# Patient Record
Sex: FEMALE | Race: WHITE | NOT HISPANIC OR LATINO | Employment: OTHER | URBAN - METROPOLITAN AREA
[De-identification: names, ages, dates, MRNs, and addresses within clinical notes are randomized per-mention and may not be internally consistent; named-entity substitution may affect disease eponyms.]

---

## 2021-11-11 ENCOUNTER — OFFICE VISIT (OUTPATIENT)
Dept: FAMILY MEDICINE CLINIC | Facility: CLINIC | Age: 81
End: 2021-11-11

## 2021-11-11 VITALS
HEIGHT: 61 IN | WEIGHT: 157.4 LBS | BODY MASS INDEX: 29.72 KG/M2 | OXYGEN SATURATION: 93 % | SYSTOLIC BLOOD PRESSURE: 109 MMHG | DIASTOLIC BLOOD PRESSURE: 64 MMHG | HEART RATE: 81 BPM

## 2021-11-11 DIAGNOSIS — M1A.9XX0 CHRONIC GOUT WITHOUT TOPHUS, UNSPECIFIED CAUSE, UNSPECIFIED SITE: ICD-10-CM

## 2021-11-11 DIAGNOSIS — E11.9 TYPE 2 DIABETES MELLITUS WITHOUT COMPLICATION, WITHOUT LONG-TERM CURRENT USE OF INSULIN (HCC): ICD-10-CM

## 2021-11-11 DIAGNOSIS — M54.50 CHRONIC BILATERAL LOW BACK PAIN WITHOUT SCIATICA: Primary | ICD-10-CM

## 2021-11-11 DIAGNOSIS — Z95.1 HISTORY OF HEART BYPASS SURGERY: ICD-10-CM

## 2021-11-11 DIAGNOSIS — F32.A ANXIETY AND DEPRESSION: ICD-10-CM

## 2021-11-11 DIAGNOSIS — E78.5 HYPERLIPIDEMIA, UNSPECIFIED HYPERLIPIDEMIA TYPE: ICD-10-CM

## 2021-11-11 DIAGNOSIS — I10 PRIMARY HYPERTENSION: ICD-10-CM

## 2021-11-11 DIAGNOSIS — F41.9 ANXIETY AND DEPRESSION: ICD-10-CM

## 2021-11-11 DIAGNOSIS — I25.810 CORONARY ARTERY DISEASE INVOLVING OTHER CORONARY ARTERY BYPASS GRAFT WITHOUT ANGINA PECTORIS: ICD-10-CM

## 2021-11-11 DIAGNOSIS — Z85.3 HISTORY OF BREAST CANCER: ICD-10-CM

## 2021-11-11 DIAGNOSIS — Z78.0 POSTMENOPAUSAL: ICD-10-CM

## 2021-11-11 DIAGNOSIS — G89.29 CHRONIC BILATERAL LOW BACK PAIN WITHOUT SCIATICA: Primary | ICD-10-CM

## 2021-11-11 DIAGNOSIS — Z23 NEED FOR INFLUENZA VACCINATION: ICD-10-CM

## 2021-11-11 PROCEDURE — 90662 IIV NO PRSV INCREASED AG IM: CPT | Performed by: NURSE PRACTITIONER

## 2021-11-11 PROCEDURE — G0008 ADMIN INFLUENZA VIRUS VAC: HCPCS | Performed by: NURSE PRACTITIONER

## 2021-11-11 PROCEDURE — 99204 OFFICE O/P NEW MOD 45 MIN: CPT | Performed by: NURSE PRACTITIONER

## 2021-11-11 RX ORDER — METOPROLOL TARTRATE 50 MG/1
50 TABLET, FILM COATED ORAL DAILY
COMMUNITY
End: 2022-01-21 | Stop reason: SDUPTHER

## 2021-11-11 RX ORDER — ALPRAZOLAM 0.5 MG/1
0.5 TABLET ORAL 2 TIMES DAILY PRN
COMMUNITY
End: 2021-11-11 | Stop reason: ALTCHOICE

## 2021-11-11 RX ORDER — VITAMIN B COMPLEX
TABLET ORAL
COMMUNITY
End: 2022-01-21 | Stop reason: SDUPTHER

## 2021-11-11 RX ORDER — ATORVASTATIN CALCIUM 80 MG/1
80 TABLET, FILM COATED ORAL DAILY
COMMUNITY
End: 2022-01-21 | Stop reason: SDUPTHER

## 2021-11-11 RX ORDER — ISOSORBIDE MONONITRATE 60 MG/1
60 TABLET, EXTENDED RELEASE ORAL DAILY
COMMUNITY
End: 2022-01-21 | Stop reason: SDUPTHER

## 2021-11-11 RX ORDER — GLIPIZIDE 5 MG/1
5 TABLET ORAL DAILY
COMMUNITY
Start: 2021-10-29 | End: 2022-01-21 | Stop reason: SDUPTHER

## 2021-11-11 RX ORDER — POTASSIUM CHLORIDE 1.5 G/1.77G
20 POWDER, FOR SOLUTION ORAL 2 TIMES DAILY
COMMUNITY
End: 2022-01-21 | Stop reason: SDUPTHER

## 2021-11-11 RX ORDER — FUROSEMIDE 80 MG
80 TABLET ORAL 2 TIMES DAILY
COMMUNITY
End: 2022-01-21 | Stop reason: SDUPTHER

## 2021-11-11 RX ORDER — HYDROCODONE BITARTRATE AND ACETAMINOPHEN 10; 325 MG/1; MG/1
1 TABLET ORAL EVERY 6 HOURS PRN
COMMUNITY
End: 2021-11-11 | Stop reason: ALTCHOICE

## 2021-11-11 RX ORDER — DULOXETIN HYDROCHLORIDE 60 MG/1
60 CAPSULE, DELAYED RELEASE ORAL DAILY
COMMUNITY
End: 2022-01-21 | Stop reason: SDUPTHER

## 2021-11-11 RX ORDER — MULTIPLE VITAMINS W/ MINERALS TAB 9MG-400MCG
1 TAB ORAL DAILY
COMMUNITY
End: 2022-01-21 | Stop reason: SDUPTHER

## 2021-11-11 RX ORDER — NAPROXEN SODIUM 220 MG
220 TABLET ORAL 2 TIMES DAILY PRN
COMMUNITY
End: 2022-01-21 | Stop reason: SDUPTHER

## 2021-11-11 RX ORDER — PHENOL 1.4 %
600 AEROSOL, SPRAY (ML) MUCOUS MEMBRANE DAILY
COMMUNITY
End: 2022-01-21 | Stop reason: SDUPTHER

## 2021-11-11 RX ORDER — ALLOPURINOL 300 MG/1
300 TABLET ORAL DAILY
COMMUNITY
End: 2022-01-21 | Stop reason: SDUPTHER

## 2021-11-11 RX ORDER — ASPIRIN 81 MG/1
81 TABLET ORAL DAILY
COMMUNITY
End: 2022-01-21 | Stop reason: SDUPTHER

## 2021-11-11 NOTE — PROGRESS NOTES
Chief Complaint  Establish Care, Diabetes, Back Pain, Anxiety, Depression, Hypertension, and Hyperlipidemia    Subjective          Maritza Whitmore presents to [facility]  Pt presents today for establish care-she just moved here from [place]  Pt would like to discuss medications   Pt has been having hot flashes   Pt has no other issues or concerns     DM-dx with diabetes a couple yrs ago-her last A1C was 7.1% On 9/17/21. Strong family hx of DM-mother, grandmother, son and grandson, brothers, uncles. Reports her mom was a double amputee. States her diabetic medication was recently changed from glucophage to glipizide. States she was on metformin in the past she thought she was allergic to it-states she had pruritis but it did not resolve when it was discontinued. States her blood sugar runs 127-142. Her family is trying to break her sugar addiction-she loves coke.     She was living with her son in a camper in [place] and he was a heroin drug addict-she moved down here to be with her other son and his family. She recently moved into an apartment.    Hx of breast cancer 9/11-she had a left mastectomy-she did not require chemo or radiation.    Back pain-she was on Norco but has not taken it for awhile-her son who is a heroin addict was taking it. Hx of herniated disc-she had surgery 30 yrs ago. She has been taking aleve for her back pain which works well. States she took Tylenol 3 in the past which she felt worked the best. She has been to pain management in the past-referral placed.     Anxiety-she was taking alprazolam .5mg qd-she has not been taking this medication    Depression-she takes cymbalta qd.    htn-reports she does not check her b/p every day-she takes metoprolol, lasix and potassium. Her daughter n law states benjamin was under a lot of stress when she was living in [place].    Hx of blockage-she had a 3 vessel bypass-she takes isosorbide. Referred to cardiology.     Gout-hx of  "gout-she takes allopurinol 300mg daily -denies any flares.     Hyperlipidemia-she takes atorvastatin 80mg qd.          She  has a past medical history of Anxiety, Arthritis, Cataract, Congestive heart failure (HCC), Depression, Diabetes mellitus (HCC), Emphysema lung (HCC), Forgetfulness, Hernia cerebri (HCC), High blood pressure, High cholesterol, Kidney stones, Night sweats, Psychiatric complaint, Shortness of breath, and Sinus trouble.     Objective   Vital Signs:   /64 (BP Location: Right arm, Patient Position: Sitting)   Pulse 81   Ht 154.9 cm (61\")   Wt 71.4 kg (157 lb 6.4 oz)   SpO2 93%   BMI 29.74 kg/m²     Physical Exam  Constitutional:       Appearance: Normal appearance.   Neck:      Thyroid: No thyroid mass, thyromegaly or thyroid tenderness.      Vascular: No carotid bruit.   Cardiovascular:      Rate and Rhythm: Normal rate and regular rhythm.      Pulses: Normal pulses.      Heart sounds: Normal heart sounds.   Pulmonary:      Effort: Pulmonary effort is normal.      Breath sounds: Normal breath sounds.   Musculoskeletal:      Right lower leg: No edema.      Left lower leg: No edema.   Skin:     General: Skin is warm and dry.   Neurological:      General: No focal deficit present.      Mental Status: She is alert and oriented to person, place, and time.   Psychiatric:         Mood and Affect: Mood normal.         Behavior: Behavior normal.        Result Review :            Past Surgical History:   Procedure Laterality Date   • BACK SURGERY      30 years ago    • CARDIAC SURGERY     • GALLBLADDER SURGERY      30 year ago   • HYSTERECTOMY     • LAPAROSCOPIC GASTRIC BANDING      10 years ago      Family History   Problem Relation Age of Onset   • Heart disease Mother    • Diabetes Mother    • Cancer Brother         Current Outpatient Medications:   •  allopurinol (ZYLOPRIM) 300 MG tablet, Take 300 mg by mouth Daily., Disp: , Rfl:   •  aspirin 81 MG EC tablet, Take 81 mg by mouth Daily., Disp: " , Rfl:   •  atorvastatin (LIPITOR) 80 MG tablet, Take 80 mg by mouth Daily., Disp: , Rfl:   •  B Complex Vitamins (B-Complex/B-12) tablet, Take  by mouth., Disp: , Rfl:   •  calcium carbonate (OS-RANI) 600 MG tablet, Take 600 mg by mouth Daily., Disp: , Rfl:   •  calcium citrate-vitamin d (CITRACAL) 200-250 MG-UNIT tablet tablet, Take  by mouth Daily., Disp: , Rfl:   •  Doxylamine-DM 3.125-7.5 MG/5ML liquid, Take  by mouth., Disp: , Rfl:   •  DULoxetine (CYMBALTA) 60 MG capsule, Take 60 mg by mouth Daily., Disp: , Rfl:   •  furosemide (LASIX) 80 MG tablet, Take 80 mg by mouth 2 (Two) Times a Day., Disp: , Rfl:   •  glipizide (GLUCOTROL) 5 MG tablet, Take 5 mg by mouth Daily., Disp: , Rfl:   •  isosorbide mononitrate (IMDUR) 60 MG 24 hr tablet, Take 60 mg by mouth Daily., Disp: , Rfl:   •  Magnesium 400 MG capsule, Take  by mouth., Disp: , Rfl:   •  metoprolol tartrate (LOPRESSOR) 50 MG tablet, Take 50 mg by mouth 2 (Two) Times a Day., Disp: , Rfl:   •  multivitamin with minerals tablet tablet, Take 1 tablet by mouth Daily., Disp: , Rfl:   •  naproxen sodium (ALEVE) 220 MG tablet, Take 220 mg by mouth 2 (Two) Times a Day As Needed., Disp: , Rfl:   •  potassium chloride (KLOR-CON) 20 MEQ packet, Take 20 mEq by mouth 2 (Two) Times a Day., Disp: , Rfl:   •  Diclofenac Sodium (VOLTAREN) 1 % gel gel, Apply 4 g topically to the appropriate area as directed 2 (Two) Times a Day., Disp: 350 g, Rfl: 0   Assessment and Plan    Diagnoses and all orders for this visit:    1. Chronic bilateral low back pain without sciatica (Primary)  -     Ambulatory Referral to Pain Management  -     Diclofenac Sodium (VOLTAREN) 1 % gel gel; Apply 4 g topically to the appropriate area as directed 2 (Two) Times a Day.  Dispense: 350 g; Refill: 0    2. Postmenopausal  -     DEXA Bone Density Axial    3. History of breast cancer  -     Mammo Diagnostic Right With CAD; Future    4. Coronary artery disease involving other coronary artery bypass graft  without angina pectoris  -     Ambulatory Referral to Cardiology    5. History of heart bypass surgery  -     Ambulatory Referral to Cardiology    6. Need for influenza vaccination  -     Fluzone High-Dose 65+yrs    7. Anxiety and depression    8. Type 2 diabetes mellitus without complication, without long-term current use of insulin (HCC)    9. Primary hypertension    10. Chronic gout without tophus, unspecified cause, unspecified site    11. Hyperlipidemia, unspecified hyperlipidemia type        Follow Up   Return in about 4 weeks (around 12/9/2021).  Patient was given instructions and counseling regarding her condition or for health maintenance advice. Please see specific information pulled into the AVS if appropriate.     Maritza Whitmore  reports that she has never smoked. She has never used smokeless tobacco..                   Jia Ellison, APRN

## 2021-11-11 NOTE — PATIENT INSTRUCTIONS
Blood Pressure Record Sheet  To take your blood pressure, you will need a blood pressure machine. You can buy a blood pressure machine (blood pressure monitor) at your clinic, drug store, or online. When choosing one, consider:  · An automatic monitor that has an arm cuff.  · A cuff that wraps snugly around your upper arm. You should be able to fit only one finger between your arm and the cuff.  · A device that stores blood pressure reading results.  · Do not choose a monitor that measures your blood pressure from your wrist or finger.  Follow your health care provider's instructions for how to take your blood pressure. To use this form:  · Get one reading in the morning (a.m.) before you take any medicines.  · Get one reading in the evening (p.m.) before supper.  · Take at least 2 readings with each blood pressure check. This makes sure the results are correct. Wait 1-2 minutes between measurements.  · Write down the results in the spaces on this form.  · Repeat this once a week, or as told by your health care provider.  · Make a follow-up appointment with your health care provider to discuss the results.  Blood pressure log  Date: _______________________  · a.m. _____________________(1st reading) _____________________(2nd reading)  · p.m. _____________________(1st reading) _____________________(2nd reading)  Date: _______________________  · a.m. _____________________(1st reading) _____________________(2nd reading)  · p.m. _____________________(1st reading) _____________________(2nd reading)  Date: _______________________  · a.m. _____________________(1st reading) _____________________(2nd reading)  · p.m. _____________________(1st reading) _____________________(2nd reading)  Date: _______________________  · a.m. _____________________(1st reading) _____________________(2nd reading)  · p.m. _____________________(1st reading) _____________________(2nd reading)  Date: _______________________  · a.m.  _____________________(1st reading) _____________________(2nd reading)  · p.m. _____________________(1st reading) _____________________(2nd reading)  This information is not intended to replace advice given to you by your health care provider. Make sure you discuss any questions you have with your health care provider.  Document Revised: 02/15/2019 Document Reviewed: 12/18/2018  Elsevier Patient Education © 2021 [employer]Daily Diabetes Record  Check your blood glucose (BG) as directed by your health care provider. Use this form to record your BG results as well as any diabetes medicines that you take, including insulin.  Bringing a record of your BG results and a list of your current medicines to your health care provider is very helpful in managing your diabetes. These numbers help your health care provider to know whether your diabetes management plan needs to be changed.  Patient name: ____________________________________ Week of ____________________  Daily BG results and diabetes medicines  Date: _________  · Breakfast - BG / Medicines: ________________ / __________________________________________________________  · Lunch - BG / Medicines: ___________________ / __________________________________________________________  · Dinner - BG / Medicines: __________________ / __________________________________________________________  · Bedtime - BG / Medicines: ________________ / ___________________________________________________________  Date: _________  · Breakfast - BG / Medicines: ________________ / __________________________________________________________  · Lunch - BG / Medicines: ___________________ / __________________________________________________________  · Dinner - BG / Medicines: __________________ / __________________________________________________________  · Bedtime - BG / Medicines: ________________ / ___________________________________________________________  Date: _________  · Breakfast -  /  Medicines: ________________ / __________________________________________________________  · Lunch - BG / Medicines: ___________________ / __________________________________________________________  · Dinner - BG / Medicines: __________________ / __________________________________________________________  · Bedtime - BG / Medicines: ________________ / ___________________________________________________________  Date: _________  · Breakfast - BG / Medicines: ________________ / __________________________________________________________  · Lunch - BG / Medicines: ___________________ / __________________________________________________________  · Dinner - BG / Medicines: __________________ / __________________________________________________________  · Bedtime - BG / Medicines: ________________ / ___________________________________________________________  Date: _________  · Breakfast - BG / Medicines: ________________ / __________________________________________________________  · Lunch - BG / Medicines: ___________________ / __________________________________________________________  · Dinner - BG / Medicines: __________________ / __________________________________________________________  · Bedtime - BG / Medicines: ________________ / ___________________________________________________________  Date: _________  · Breakfast - BG / Medicines: ________________ / __________________________________________________________  · Lunch - BG / Medicines: ___________________ / __________________________________________________________  · Dinner - BG / Medicines: __________________ / __________________________________________________________  · Bedtime - BG / Medicines: ________________ / ___________________________________________________________  Date: _________  · Breakfast - BG / Medicines: ________________ / __________________________________________________________  · Lunch - BG / Medicines: ___________________ /  __________________________________________________________  · Dinner - BG / Medicines: __________________ / __________________________________________________________  · Bedtime - BG / Medicines: ________________ / ___________________________________________________________  Notes: ______________________________________________________________________________________________________________________  This information is not intended to replace advice given to you by your health care provider. Make sure you discuss any questions you have with your health care provider.  Document Revised: 10/01/2019 Document Reviewed: 09/15/2017  Elsevier Patient Education © 2020 [employer] Inc[employer]

## 2021-11-15 PROBLEM — G89.29 CHRONIC BILATERAL LOW BACK PAIN WITHOUT SCIATICA: Status: ACTIVE | Noted: 2021-11-15

## 2021-11-15 PROBLEM — F41.9 ANXIETY AND DEPRESSION: Status: ACTIVE | Noted: 2021-11-15

## 2021-11-15 PROBLEM — F32.A ANXIETY AND DEPRESSION: Status: ACTIVE | Noted: 2021-11-15

## 2021-11-15 PROBLEM — M1A.9XX0 CHRONIC GOUT WITHOUT TOPHUS: Status: ACTIVE | Noted: 2021-11-15

## 2021-11-15 PROBLEM — Z85.3 HISTORY OF BREAST CANCER: Status: ACTIVE | Noted: 2021-11-15

## 2021-11-15 PROBLEM — I25.10 CORONARY ARTERY DISEASE: Status: ACTIVE | Noted: 2021-11-15

## 2021-11-15 PROBLEM — Z78.0 POSTMENOPAUSAL: Status: ACTIVE | Noted: 2021-11-15

## 2021-11-15 PROBLEM — E78.5 HYPERLIPIDEMIA: Status: ACTIVE | Noted: 2021-11-15

## 2021-11-15 PROBLEM — E11.9 TYPE 2 DIABETES MELLITUS WITHOUT COMPLICATION, WITHOUT LONG-TERM CURRENT USE OF INSULIN (HCC): Status: ACTIVE | Noted: 2021-11-15

## 2021-11-15 PROBLEM — I10 PRIMARY HYPERTENSION: Status: ACTIVE | Noted: 2021-11-15

## 2021-11-15 PROBLEM — M54.50 CHRONIC BILATERAL LOW BACK PAIN WITHOUT SCIATICA: Status: ACTIVE | Noted: 2021-11-15

## 2021-11-15 PROBLEM — Z95.1 HISTORY OF HEART BYPASS SURGERY: Status: ACTIVE | Noted: 2021-11-15

## 2021-12-08 ENCOUNTER — HOSPITAL ENCOUNTER (OUTPATIENT)
Dept: MAMMOGRAPHY | Facility: HOSPITAL | Age: 81
Discharge: HOME | End: 2021-12-08

## 2021-12-08 ENCOUNTER — HOSPITAL ENCOUNTER (OUTPATIENT)
Dept: BONE DENSITY | Facility: HOSPITAL | Age: 81
Discharge: HOME | End: 2021-12-08

## 2021-12-08 ENCOUNTER — TRANSCRIBE ORDERS (OUTPATIENT)
Dept: ADMINISTRATIVE | Facility: HOSPITAL | Age: 81
End: 2021-12-08

## 2021-12-08 DIAGNOSIS — Z85.3 HISTORY OF BREAST CANCER: Primary | ICD-10-CM

## 2021-12-08 DIAGNOSIS — Z12.31 ENCOUNTER FOR SCREENING MAMMOGRAM FOR MALIGNANT NEOPLASM OF BREAST: ICD-10-CM

## 2021-12-08 DIAGNOSIS — Z12.31 SCREENING MAMMOGRAM, ENCOUNTER FOR: ICD-10-CM

## 2021-12-08 DIAGNOSIS — Z12.31 SCREENING MAMMOGRAM, ENCOUNTER FOR: Primary | ICD-10-CM

## 2021-12-08 DIAGNOSIS — Z85.3 HISTORY OF BREAST CANCER: ICD-10-CM

## 2021-12-08 PROCEDURE — 77080 DXA BONE DENSITY AXIAL: CPT

## 2021-12-08 PROCEDURE — 77067 SCR MAMMO BI INCL CAD: CPT

## 2021-12-08 PROCEDURE — 77063 BREAST TOMOSYNTHESIS BI: CPT

## 2021-12-09 ENCOUNTER — TELEPHONE (OUTPATIENT)
Dept: FAMILY MEDICINE CLINIC | Facility: CLINIC | Age: 81
End: 2021-12-09

## 2021-12-09 NOTE — TELEPHONE ENCOUNTER
----- Message from ALBER Candelaria sent at 12/8/2021 10:14 PM EST -----  Normal lumbar spine, osteopenia femoral neck-cont calcium, vit D and wt bearing exercise

## 2021-12-09 NOTE — TELEPHONE ENCOUNTER
LMTCB   O-T Advancement Flap Text: The defect edges were debeveled with a #15 scalpel blade.  Given the location of the defect, shape of the defect and the proximity to free margins an O-T advancement flap was deemed most appropriate.  Using a sterile surgical marker, an appropriate advancement flap was drawn incorporating the defect and placing the expected incisions within the relaxed skin tension lines where possible.    The area thus outlined was incised deep to adipose tissue with a #15 scalpel blade.  The skin margins were undermined to an appropriate distance in all directions utilizing iris scissors.

## 2021-12-10 ENCOUNTER — TELEPHONE (OUTPATIENT)
Dept: FAMILY MEDICINE CLINIC | Facility: CLINIC | Age: 81
End: 2021-12-10

## 2021-12-10 NOTE — TELEPHONE ENCOUNTER
----- Message from ALBER Candelaria sent at 12/10/2021 12:40 PM EST -----  Right mammogram normal -history of left mastectomy-repeat in 1 yr

## 2021-12-15 ENCOUNTER — TELEPHONE (OUTPATIENT)
Dept: FAMILY MEDICINE CLINIC | Facility: CLINIC | Age: 81
End: 2021-12-15

## 2021-12-16 ENCOUNTER — OFFICE VISIT (OUTPATIENT)
Dept: FAMILY MEDICINE CLINIC | Facility: CLINIC | Age: 81
End: 2021-12-16

## 2021-12-16 VITALS
OXYGEN SATURATION: 95 % | BODY MASS INDEX: 28.93 KG/M2 | SYSTOLIC BLOOD PRESSURE: 126 MMHG | DIASTOLIC BLOOD PRESSURE: 57 MMHG | HEIGHT: 62 IN | HEART RATE: 87 BPM | WEIGHT: 157.2 LBS

## 2021-12-16 DIAGNOSIS — Z13.29 SCREENING FOR THYROID DISORDER: ICD-10-CM
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
## 2021-12-16 DIAGNOSIS — E78.5 HYPERLIPIDEMIA, UNSPECIFIED HYPERLIPIDEMIA TYPE: Primary | ICD-10-CM

## 2021-12-16 DIAGNOSIS — E55.9 VITAMIN D DEFICIENCY: ICD-10-CM

## 2021-12-16 DIAGNOSIS — Z01.89 ROUTINE LAB DRAW: ICD-10-CM

## 2021-12-16 DIAGNOSIS — I10 PRIMARY HYPERTENSION: ICD-10-CM

## 2021-12-16 DIAGNOSIS — M10.9 GOUT, UNSPECIFIED CAUSE, UNSPECIFIED CHRONICITY, UNSPECIFIED SITE: ICD-10-CM

## 2021-12-16 DIAGNOSIS — E11.9 TYPE 2 DIABETES MELLITUS WITHOUT COMPLICATION, WITHOUT LONG-TERM CURRENT USE OF INSULIN (HCC): ICD-10-CM

## 2021-12-16 DIAGNOSIS — E53.8 VITAMIN B12 DEFICIENCY: ICD-10-CM

## 2021-12-16 DIAGNOSIS — D64.9 ANEMIA, UNSPECIFIED TYPE: ICD-10-CM

## 2021-12-16 PROCEDURE — 1159F MED LIST DOCD IN RCRD: CPT | Performed by: NURSE PRACTITIONER

## 2021-12-16 PROCEDURE — 99213 OFFICE O/P EST LOW 20 MIN: CPT | Performed by: NURSE PRACTITIONER

## 2021-12-16 PROCEDURE — G0439 PPPS, SUBSEQ VISIT: HCPCS | Performed by: NURSE PRACTITIONER

## 2021-12-16 PROCEDURE — 1170F FXNL STATUS ASSESSED: CPT | Performed by: NURSE PRACTITIONER

## 2021-12-16 NOTE — PROGRESS NOTES
The ABCs of the Annual Wellness Visit  Initial Medicare Wellness Visit    Chief Complaint   Patient presents with   • Follow-up     Subjective   History of Present Illness:  Maritza Whitmore is a 81 y.o. female who presents for an Initial Medicare Wellness Visit.    The following portions of the patient's history were reviewed and   updated as appropriate: allergies, current medications, past family history, past medical history, past social history, past surgical history and problem list.     Compared to one year ago, the patient feels her physical   health is worse.    Compared to one year ago, the patient feels her mental   health is better.    Recent Hospitalizations:  She was not admitted to the hospital during the last year.       Current Medical Providers:  Patient Care Team:  Jia Ellison APRN as PCP - General (Nurse Practitioner)    Outpatient Medications Prior to Visit   Medication Sig Dispense Refill   • allopurinol (ZYLOPRIM) 300 MG tablet Take 300 mg by mouth Daily.     • aspirin 81 MG EC tablet Take 81 mg by mouth Daily.     • atorvastatin (LIPITOR) 80 MG tablet Take 80 mg by mouth Daily.     • B Complex Vitamins (B-Complex/B-12) tablet Take  by mouth.     • calcium carbonate (OS-RANI) 600 MG tablet Take 600 mg by mouth Daily.     • calcium citrate-vitamin d (CITRACAL) 200-250 MG-UNIT tablet tablet Take  by mouth Daily.     • cyclobenzaprine (FLEXERIL) 10 MG tablet Take 1 tablet by mouth 3 (Three) Times a Day As Needed for Muscle Spasms. 20 tablet 0   • Diclofenac Sodium (VOLTAREN) 1 % gel gel Apply 4 g topically to the appropriate area as directed 2 (Two) Times a Day. 350 g 0   • Doxylamine-DM 3.125-7.5 MG/5ML liquid Take  by mouth.     • DULoxetine (CYMBALTA) 60 MG capsule Take 60 mg by mouth Daily.     • furosemide (LASIX) 80 MG tablet Take 80 mg by mouth 2 (Two) Times a Day.     • glipizide (GLUCOTROL) 5 MG tablet Take 5 mg by mouth Daily.     • isosorbide mononitrate (IMDUR) 60 MG 24 hr  "tablet Take 60 mg by mouth Daily.     • Magnesium 400 MG capsule Take  by mouth.     • metoprolol tartrate (LOPRESSOR) 50 MG tablet Take 50 mg by mouth Daily.     • multivitamin with minerals tablet tablet Take 1 tablet by mouth Daily.     • naproxen sodium (ALEVE) 220 MG tablet Take 220 mg by mouth 2 (Two) Times a Day As Needed.     • potassium chloride (KLOR-CON) 20 MEQ packet Take 20 mEq by mouth 2 (Two) Times a Day.       No facility-administered medications prior to visit.       No opioid medication identified on active medication list. I have reviewed chart for other potential  high risk medication/s and harmful drug interactions in the elderly.          Aspirin is on active medication list. Aspirin use is indicated based on review of current medical condition/s. Pros and cons of this therapy have been discussed today. Benefits of this medication outweigh potential harm.  Patient has been encouraged to continue taking this medication.  .      Patient Active Problem List   Diagnosis   • Chronic bilateral low back pain without sciatica   • Postmenopausal   • History of breast cancer   • Coronary artery disease   • History of heart bypass surgery   • Anxiety and depression   • Type 2 diabetes mellitus without complication, without long-term current use of insulin (HCC)   • Primary hypertension   • Chronic gout without tophus   • Hyperlipidemia     Advance Care Planning  Advance Directive is not on file.  ACP discussion was held with the patient during this visit. Patient does not have an advance directive, declines further assistance.          Objective       Vitals:    12/16/21 1432   BP: 126/57   BP Location: Right arm   Patient Position: Sitting   Pulse: 87   SpO2: 95%   Weight: 71.3 kg (157 lb 3.2 oz)   Height: 157.5 cm (62\")     BMI Readings from Last 1 Encounters:   12/16/21 28.75 kg/m²   BMI is above normal parameters. Recommendations include: exercise counseling and nutrition counseling    Does the " patient have evidence of cognitive impairment? No    Physical Exam          HEALTH RISK ASSESSMENT    Smoking Status:  Social History     Tobacco Use   Smoking Status Never Smoker   Smokeless Tobacco Never Used     Alcohol Consumption:  Social History     Substance and Sexual Activity   Alcohol Use Never     Fall Risk Screen:    JOHN Fall Risk Assessment was completed, and patient is at HIGH risk for falls. Assessment completed on:12/16/2021    Depression Screen:   PHQ-2/PHQ-9 Depression Screening 11/11/2021   Little interest or pleasure in doing things 0   Feeling down, depressed, or hopeless 0   Total Score 0       Health Habits and Functional and Cognitive Screening:  Functional & Cognitive Status 12/16/2021   Do you have difficulty preparing food and eating? No   Do you have difficulty bathing yourself, getting dressed or grooming yourself? No   Do you have difficulty using the toilet? No   Do you have difficulty moving around from place to place? No   Do you have trouble with steps or getting out of a bed or a chair? No   Current Diet Other   Dental Exam Up to date   Eye Exam Up to date   Exercise (times per week) 0 times per week   Do you need help using the phone?  No   Are you deaf or do you have serious difficulty hearing?  No   Do you need help with transportation? No   Do you need help shopping? No   Do you need help preparing meals?  No   Do you need help with housework?  No   Do you need help with laundry? No   Do you need help taking your medications? No   Do you need help managing money? No   Do you ever drive or ride in a car without wearing a seat belt? No   Have you felt unusual stress, anger or loneliness in the last month? Yes   Who do you live with? Alone   If you need help, do you have trouble finding someone available to you? No   Have you been bothered in the last four weeks by sexual problems? No   Do you have difficulty concentrating, remembering or making decisions? No        Age-appropriate Screening Schedule:  Refer to the list below for future screening recommendations based on patient's age, sex and/or medical conditions. Orders for these recommended tests are listed in the plan section. The patient has been provided with a written plan.    Health Maintenance   Topic Date Due   • URINE MICROALBUMIN  Never done   • TDAP/TD VACCINES (1 - Tdap) Never done   • LIPID PANEL  Never done   • HEMOGLOBIN A1C  Never done   • DIABETIC EYE EXAM  02/03/2022 (Originally 11/11/2021)   • ZOSTER VACCINE (1 of 2) 12/16/2022 (Originally [birth year]/4/1991)   • DXA SCAN  12/08/2023   • INFLUENZA VACCINE  Completed            Assessment/Plan   CMS Preventative Services Quick Reference  Risk Factors Identified During Encounter  Immunizations Discussed/Encouraged (specific Immunizations; Tdap, Pneumococcal 23, Shingrix and COVID19  The above risks/problems have been discussed with the patient.  Follow up actions/plans if indicated are seen below in the Assessment/Plan Section.  Pertinent information has been shared with the patient in the After Visit Summary.    Diagnoses and all orders for this visit:    1. Hyperlipidemia, unspecified hyperlipidemia type (Primary)  -     Lipid Panel; Future    2. Primary hypertension  -     Comprehensive Metabolic Panel; Future    3. Type 2 diabetes mellitus without complication, without long-term current use of insulin (HCC)  -     MicroAlbumin, Urine, Random - Urine, Clean Catch; Future  -     Hemoglobin A1c; Future  -     Ambulatory Referral to Diabetic Education    4. Gout, unspecified cause, unspecified chronicity, unspecified site  -     Uric acid; Future    5. Vitamin D deficiency  -     Vitamin D 25 Hydroxy; Future    6. Screening for thyroid disorder  -     TSH; Future    7. Vitamin B12 deficiency  -     Vitamin B12; Future    8. Routine lab draw    9. Anemia, unspecified type  -     CBC & Differential; Future        Follow Up:  Return in about 3 months (around  3/16/2022).     An After Visit Summary and PPPS were made available to the patient.        I spent 25 minutes caring for Maritza on this date of service. This time includes time spent by me in the following activities:preparing for the visit, obtaining and/or reviewing a separately obtained history, performing a medically appropriate examination and/or evaluation , counseling and educating the patient/family/caregiver, ordering medications, tests, or procedures, documenting information in the medical record and independently interpreting results and communicating that information with the patient/family/caregiver

## 2021-12-16 NOTE — PROGRESS NOTES
"Chief Complaint  Follow-up    Subjective          Maritza Whitmore presents to [facility]  History of Present Illness pt presents today for follow up  Pt wants to discuss getting medication through mail pharmacy  Pt has no other issues or concerns.   Pt brought in her bp log       Pt wants to discuss getting medication through mail pharmacy    Pt has no other issues or concerns.     Pt brought in her bp log -her blood pressures range from 101-161/66-93-most are ranging 120-130/80's.    She had a fall since her last visit-states she was walking and tripped over her dog. She had some pain under the left mastectomy scar-she was given muscle relaxers which helped. She had another fall 3 wks later-she tripped over the dog again when walking.       She  has a past medical history of Anxiety, Arthritis, Cataract, Congestive heart failure (HCC), Depression, Diabetes mellitus (HCC), Emphysema lung (HCC), Forgetfulness, Hernia cerebri (HCC), High blood pressure, High cholesterol, Kidney stones, Night sweats, Psychiatric complaint, Shortness of breath, and Sinus trouble.     Objective   Vital Signs:   /57 (BP Location: Right arm, Patient Position: Sitting)   Pulse 87   Ht 157.5 cm (62\")   Wt 71.3 kg (157 lb 3.2 oz)   SpO2 95%   BMI 28.75 kg/m²     Physical Exam   Result Review :            Past Surgical History:   Procedure Laterality Date   • BACK SURGERY      30 years ago    • CARDIAC SURGERY     • GALLBLADDER SURGERY      30 year ago   • HYSTERECTOMY     • LAPAROSCOPIC GASTRIC BANDING      10 years ago      Family History   Problem Relation Age of Onset   • Heart disease Mother    • Diabetes Mother    • Cancer Brother         Current Outpatient Medications:   •  allopurinol (ZYLOPRIM) 300 MG tablet, Take 300 mg by mouth Daily., Disp: , Rfl:   •  aspirin 81 MG EC tablet, Take 81 mg by mouth Daily., Disp: , Rfl:   •  atorvastatin (LIPITOR) 80 MG tablet, Take 80 mg by mouth Daily., " Disp: , Rfl:   •  B Complex Vitamins (B-Complex/B-12) tablet, Take  by mouth., Disp: , Rfl:   •  calcium carbonate (OS-RANI) 600 MG tablet, Take 600 mg by mouth Daily., Disp: , Rfl:   •  calcium citrate-vitamin d (CITRACAL) 200-250 MG-UNIT tablet tablet, Take  by mouth Daily., Disp: , Rfl:   •  cyclobenzaprine (FLEXERIL) 10 MG tablet, Take 1 tablet by mouth 3 (Three) Times a Day As Needed for Muscle Spasms., Disp: 20 tablet, Rfl: 0  •  Diclofenac Sodium (VOLTAREN) 1 % gel gel, Apply 4 g topically to the appropriate area as directed 2 (Two) Times a Day., Disp: 350 g, Rfl: 0  •  Doxylamine-DM 3.125-7.5 MG/5ML liquid, Take  by mouth., Disp: , Rfl:   •  DULoxetine (CYMBALTA) 60 MG capsule, Take 60 mg by mouth Daily., Disp: , Rfl:   •  furosemide (LASIX) 80 MG tablet, Take 80 mg by mouth 2 (Two) Times a Day., Disp: , Rfl:   •  glipizide (GLUCOTROL) 5 MG tablet, Take 5 mg by mouth Daily., Disp: , Rfl:   •  isosorbide mononitrate (IMDUR) 60 MG 24 hr tablet, Take 60 mg by mouth Daily., Disp: , Rfl:   •  Magnesium 400 MG capsule, Take  by mouth., Disp: , Rfl:   •  metoprolol tartrate (LOPRESSOR) 50 MG tablet, Take 50 mg by mouth Daily., Disp: , Rfl:   •  multivitamin with minerals tablet tablet, Take 1 tablet by mouth Daily., Disp: , Rfl:   •  naproxen sodium (ALEVE) 220 MG tablet, Take 220 mg by mouth 2 (Two) Times a Day As Needed., Disp: , Rfl:   •  potassium chloride (KLOR-CON) 20 MEQ packet, Take 20 mEq by mouth 2 (Two) Times a Day., Disp: , Rfl:    Assessment and Plan    Diagnoses and all orders for this visit:    1. Hyperlipidemia, unspecified hyperlipidemia type (Primary)  -     Lipid Panel; Future    2. Primary hypertension  Comments:  blood pressure well controlled with metoprolol and lasix.  Orders:  -     Comprehensive Metabolic Panel; Future    3. Type 2 diabetes mellitus without complication, without long-term current use of insulin (HCC)  -     MicroAlbumin, Urine, Random - Urine, Clean Catch; Future  -      Hemoglobin A1c; Future  -     Ambulatory Referral to Diabetic Education    4. Gout, unspecified cause, unspecified chronicity, unspecified site  -     Uric acid; Future    5. Vitamin D deficiency  -     Vitamin D 25 Hydroxy; Future    6. Screening for thyroid disorder  -     TSH; Future    7. Vitamin B12 deficiency  -     Vitamin B12; Future    8. Routine lab draw    9. Anemia, unspecified type  -     CBC & Differential; Future    10. Fall, initial encounter  Comments:  2 falls since her last visit-admits both epidoses occurred due to tripping over her dog.        Follow Up   Return in about 3 months (around 3/16/2022).  Patient was given instructions and counseling regarding her condition or for health maintenance advice. Please see specific information pulled into the AVS if appropriate.     Maritza Whitmore  reports that she has never smoked. She has never used smokeless tobacco.              Jia Ellison, APRN

## 2021-12-17 NOTE — PROGRESS NOTES
Yes she sent the message to me-I was wanting you to contact the pt to let them know it is not covered-thanks-Jia

## 2021-12-21 ENCOUNTER — TELEPHONE (OUTPATIENT)
Dept: FAMILY MEDICINE CLINIC | Facility: CLINIC | Age: 81
End: 2021-12-21

## 2022-01-07 DIAGNOSIS — U07.1 COVID-19: Primary | ICD-10-CM

## 2022-01-07 RX ORDER — AZITHROMYCIN 250 MG/1
TABLET, FILM COATED ORAL
Qty: 6 TABLET | Refills: 0 | Status: SHIPPED | OUTPATIENT
Start: 2022-01-07 | End: 2022-03-22

## 2022-01-07 RX ORDER — DEXAMETHASONE 6 MG/1
6 TABLET ORAL
Qty: 10 TABLET | Refills: 0 | Status: SHIPPED | OUTPATIENT
Start: 2022-01-07 | End: 2022-01-17

## 2022-01-13 ENCOUNTER — TELEPHONE (OUTPATIENT)
Dept: FAMILY MEDICINE CLINIC | Facility: CLINIC | Age: 82
End: 2022-01-13

## 2022-01-13 NOTE — TELEPHONE ENCOUNTER
Phoned the patient left information that her insurance will not cover the CGM will cancel the referral for this to be done

## 2022-01-21 DIAGNOSIS — M54.50 CHRONIC BILATERAL LOW BACK PAIN WITHOUT SCIATICA: ICD-10-CM

## 2022-01-21 DIAGNOSIS — G89.29 CHRONIC BILATERAL LOW BACK PAIN WITHOUT SCIATICA: ICD-10-CM

## 2022-01-21 DIAGNOSIS — Z00.00 HEALTHCARE MAINTENANCE: ICD-10-CM

## 2022-01-21 DIAGNOSIS — M1A.9XX0 CHRONIC GOUT WITHOUT TOPHUS, UNSPECIFIED CAUSE, UNSPECIFIED SITE: ICD-10-CM

## 2022-01-21 DIAGNOSIS — U07.1 COVID-19: ICD-10-CM

## 2022-01-21 DIAGNOSIS — E11.9 TYPE 2 DIABETES MELLITUS WITHOUT COMPLICATION, WITHOUT LONG-TERM CURRENT USE OF INSULIN: ICD-10-CM

## 2022-01-21 DIAGNOSIS — I10 PRIMARY HYPERTENSION: Primary | ICD-10-CM

## 2022-01-21 DIAGNOSIS — E78.5 HYPERLIPIDEMIA, UNSPECIFIED HYPERLIPIDEMIA TYPE: ICD-10-CM

## 2022-01-21 DIAGNOSIS — M19.90 ARTHRITIS: ICD-10-CM

## 2022-01-21 RX ORDER — AZITHROMYCIN 250 MG/1
TABLET, FILM COATED ORAL
Qty: 6 TABLET | Refills: 0 | Status: CANCELLED | OUTPATIENT
Start: 2022-01-21

## 2022-01-21 RX ORDER — METOPROLOL SUCCINATE 50 MG/1
50 TABLET, EXTENDED RELEASE ORAL DAILY
COMMUNITY
Start: 2022-01-10 | End: 2022-01-21 | Stop reason: SDUPTHER

## 2022-01-21 RX ORDER — CYCLOBENZAPRINE HCL 10 MG
10 TABLET ORAL 3 TIMES DAILY PRN
Qty: 20 TABLET | Refills: 0 | Status: CANCELLED | OUTPATIENT
Start: 2022-01-21

## 2022-01-22 RX ORDER — FUROSEMIDE 80 MG
80 TABLET ORAL 2 TIMES DAILY
Qty: 90 TABLET | Refills: 1 | Status: SHIPPED | OUTPATIENT
Start: 2022-01-22 | End: 2022-02-07 | Stop reason: SDUPTHER

## 2022-01-22 RX ORDER — ISOSORBIDE MONONITRATE 60 MG/1
60 TABLET, EXTENDED RELEASE ORAL DAILY
Qty: 90 TABLET | Refills: 1 | Status: SHIPPED | OUTPATIENT
Start: 2022-01-22 | End: 2022-02-07 | Stop reason: SDUPTHER

## 2022-01-22 RX ORDER — METOPROLOL SUCCINATE 50 MG/1
50 TABLET, EXTENDED RELEASE ORAL DAILY
Qty: 90 TABLET | Refills: 1 | Status: SHIPPED | OUTPATIENT
Start: 2022-01-22 | End: 2022-02-07 | Stop reason: SDUPTHER

## 2022-01-22 RX ORDER — ALLOPURINOL 300 MG/1
300 TABLET ORAL DAILY
Qty: 90 TABLET | Refills: 1 | Status: SHIPPED | OUTPATIENT
Start: 2022-01-22 | End: 2022-02-07 | Stop reason: SDUPTHER

## 2022-01-22 RX ORDER — ASPIRIN 81 MG/1
81 TABLET ORAL DAILY
Qty: 90 TABLET | Refills: 1 | Status: SHIPPED | OUTPATIENT
Start: 2022-01-22 | End: 2022-02-07 | Stop reason: SDUPTHER

## 2022-01-22 RX ORDER — POTASSIUM CHLORIDE 1.5 G/1.77G
20 POWDER, FOR SOLUTION ORAL 2 TIMES DAILY
Qty: 180 EACH | Refills: 0 | Status: SHIPPED | OUTPATIENT
Start: 2022-01-22 | End: 2022-02-07 | Stop reason: SDUPTHER

## 2022-01-22 RX ORDER — GLIPIZIDE 5 MG/1
5 TABLET ORAL DAILY
Qty: 90 TABLET | Refills: 1 | Status: SHIPPED | OUTPATIENT
Start: 2022-01-22 | End: 2022-02-07 | Stop reason: SDUPTHER

## 2022-01-22 RX ORDER — VITAMIN B COMPLEX
1 TABLET ORAL DAILY
Qty: 90 EACH | Refills: 1 | Status: SHIPPED | OUTPATIENT
Start: 2022-01-22 | End: 2022-02-07 | Stop reason: SDUPTHER

## 2022-01-22 RX ORDER — NAPROXEN SODIUM 220 MG
220 TABLET ORAL 2 TIMES DAILY PRN
Qty: 180 TABLET | Refills: 0 | Status: SHIPPED | OUTPATIENT
Start: 2022-01-22 | End: 2022-02-07 | Stop reason: SDUPTHER

## 2022-01-22 RX ORDER — PHENOL 1.4 %
600 AEROSOL, SPRAY (ML) MUCOUS MEMBRANE DAILY
Qty: 90 TABLET | Refills: 1 | Status: SHIPPED | OUTPATIENT
Start: 2022-01-22 | End: 2022-02-07 | Stop reason: SDUPTHER

## 2022-01-22 RX ORDER — MULTIPLE VITAMINS W/ MINERALS TAB 9MG-400MCG
1 TAB ORAL DAILY
Qty: 90 EACH | Refills: 0 | Status: SHIPPED | OUTPATIENT
Start: 2022-01-22 | End: 2022-02-07 | Stop reason: SDUPTHER

## 2022-01-22 RX ORDER — ATORVASTATIN CALCIUM 80 MG/1
80 TABLET, FILM COATED ORAL DAILY
Qty: 90 TABLET | Refills: 1 | Status: SHIPPED | OUTPATIENT
Start: 2022-01-22 | End: 2022-02-07 | Stop reason: SDUPTHER

## 2022-01-22 RX ORDER — DULOXETIN HYDROCHLORIDE 60 MG/1
60 CAPSULE, DELAYED RELEASE ORAL DAILY
Qty: 90 CAPSULE | Refills: 1 | Status: SHIPPED | OUTPATIENT
Start: 2022-01-22 | End: 2022-02-07 | Stop reason: SDUPTHER

## 2022-02-07 DIAGNOSIS — M19.90 ARTHRITIS: ICD-10-CM

## 2022-02-07 DIAGNOSIS — E11.9 TYPE 2 DIABETES MELLITUS WITHOUT COMPLICATION, WITHOUT LONG-TERM CURRENT USE OF INSULIN: ICD-10-CM

## 2022-02-07 DIAGNOSIS — I10 PRIMARY HYPERTENSION: ICD-10-CM

## 2022-02-07 DIAGNOSIS — Z00.00 HEALTHCARE MAINTENANCE: ICD-10-CM

## 2022-02-07 DIAGNOSIS — E78.5 HYPERLIPIDEMIA, UNSPECIFIED HYPERLIPIDEMIA TYPE: ICD-10-CM

## 2022-02-07 DIAGNOSIS — G89.29 CHRONIC BILATERAL LOW BACK PAIN WITHOUT SCIATICA: ICD-10-CM

## 2022-02-07 DIAGNOSIS — M1A.9XX0 CHRONIC GOUT WITHOUT TOPHUS, UNSPECIFIED CAUSE, UNSPECIFIED SITE: ICD-10-CM

## 2022-02-07 DIAGNOSIS — U07.1 COVID-19: ICD-10-CM

## 2022-02-07 DIAGNOSIS — M54.50 CHRONIC BILATERAL LOW BACK PAIN WITHOUT SCIATICA: ICD-10-CM

## 2022-02-09 RX ORDER — VITAMIN B COMPLEX
1 TABLET ORAL DAILY
Qty: 90 EACH | Refills: 1 | Status: SHIPPED | OUTPATIENT
Start: 2022-02-09

## 2022-02-09 RX ORDER — NAPROXEN SODIUM 220 MG
220 TABLET ORAL 2 TIMES DAILY PRN
Qty: 180 TABLET | Refills: 0 | Status: SHIPPED | OUTPATIENT
Start: 2022-02-09 | End: 2022-03-22 | Stop reason: ALTCHOICE

## 2022-02-09 RX ORDER — METOPROLOL SUCCINATE 50 MG/1
50 TABLET, EXTENDED RELEASE ORAL DAILY
Qty: 90 TABLET | Refills: 1 | Status: SHIPPED | OUTPATIENT
Start: 2022-02-09 | End: 2022-03-10 | Stop reason: SDUPTHER

## 2022-02-09 RX ORDER — ATORVASTATIN CALCIUM 80 MG/1
80 TABLET, FILM COATED ORAL DAILY
Qty: 90 TABLET | Refills: 1 | Status: SHIPPED | OUTPATIENT
Start: 2022-02-09

## 2022-02-09 RX ORDER — PHENOL 1.4 %
600 AEROSOL, SPRAY (ML) MUCOUS MEMBRANE DAILY
Qty: 90 TABLET | Refills: 1 | Status: SHIPPED | OUTPATIENT
Start: 2022-02-09

## 2022-02-09 RX ORDER — ALLOPURINOL 300 MG/1
300 TABLET ORAL DAILY
Qty: 90 TABLET | Refills: 1 | Status: SHIPPED | OUTPATIENT
Start: 2022-02-09 | End: 2022-03-10 | Stop reason: SDUPTHER

## 2022-02-09 RX ORDER — FUROSEMIDE 80 MG
80 TABLET ORAL 2 TIMES DAILY
Qty: 90 TABLET | Refills: 1 | Status: SHIPPED | OUTPATIENT
Start: 2022-02-09

## 2022-02-09 RX ORDER — ASPIRIN 81 MG/1
81 TABLET ORAL DAILY
Qty: 90 TABLET | Refills: 1 | Status: SHIPPED | OUTPATIENT
Start: 2022-02-09

## 2022-02-09 RX ORDER — ISOSORBIDE MONONITRATE 60 MG/1
60 TABLET, EXTENDED RELEASE ORAL DAILY
Qty: 90 TABLET | Refills: 1 | Status: SHIPPED | OUTPATIENT
Start: 2022-02-09 | End: 2022-03-10 | Stop reason: SDUPTHER

## 2022-02-09 RX ORDER — DULOXETIN HYDROCHLORIDE 60 MG/1
60 CAPSULE, DELAYED RELEASE ORAL DAILY
Qty: 90 CAPSULE | Refills: 1 | Status: SHIPPED | OUTPATIENT
Start: 2022-02-09 | End: 2022-02-21 | Stop reason: SDUPTHER

## 2022-02-09 RX ORDER — MULTIPLE VITAMINS W/ MINERALS TAB 9MG-400MCG
1 TAB ORAL DAILY
Qty: 90 EACH | Refills: 0 | Status: SHIPPED | OUTPATIENT
Start: 2022-02-09

## 2022-02-09 RX ORDER — POTASSIUM CHLORIDE 1.5 G/1.77G
20 POWDER, FOR SOLUTION ORAL 2 TIMES DAILY
Qty: 180 EACH | Refills: 0 | Status: SHIPPED | OUTPATIENT
Start: 2022-02-09 | End: 2022-08-09 | Stop reason: SDUPTHER

## 2022-02-09 RX ORDER — GLIPIZIDE 5 MG/1
5 TABLET ORAL DAILY
Qty: 90 TABLET | Refills: 1 | Status: SHIPPED | OUTPATIENT
Start: 2022-02-09 | End: 2022-08-05

## 2022-02-21 ENCOUNTER — LAB (OUTPATIENT)
Dept: LAB | Facility: HOSPITAL | Age: 82
End: 2022-02-21

## 2022-02-21 DIAGNOSIS — E53.8 VITAMIN B12 DEFICIENCY: ICD-10-CM

## 2022-02-21 DIAGNOSIS — I10 PRIMARY HYPERTENSION: ICD-10-CM

## 2022-02-21 DIAGNOSIS — D64.9 ANEMIA, UNSPECIFIED TYPE: ICD-10-CM

## 2022-02-21 DIAGNOSIS — E78.5 HYPERLIPIDEMIA, UNSPECIFIED HYPERLIPIDEMIA TYPE: ICD-10-CM

## 2022-02-21 DIAGNOSIS — E11.9 TYPE 2 DIABETES MELLITUS WITHOUT COMPLICATION, WITHOUT LONG-TERM CURRENT USE OF INSULIN: ICD-10-CM

## 2022-02-21 DIAGNOSIS — M10.9 GOUT, UNSPECIFIED CAUSE, UNSPECIFIED CHRONICITY, UNSPECIFIED SITE: ICD-10-CM

## 2022-02-21 DIAGNOSIS — M54.50 CHRONIC BILATERAL LOW BACK PAIN WITHOUT SCIATICA: ICD-10-CM

## 2022-02-21 DIAGNOSIS — G89.29 CHRONIC BILATERAL LOW BACK PAIN WITHOUT SCIATICA: ICD-10-CM

## 2022-02-21 DIAGNOSIS — E55.9 VITAMIN D DEFICIENCY: ICD-10-CM

## 2022-02-21 DIAGNOSIS — Z13.29 SCREENING FOR THYROID DISORDER: ICD-10-CM

## 2022-02-21 LAB
25(OH)D3 SERPL-MCNC: 71.7 NG/ML (ref 30–100)
ALBUMIN SERPL-MCNC: 4.1 G/DL (ref 3.5–5.2)
ALBUMIN UR-MCNC: 1.6 MG/DL
ALBUMIN/GLOB SERPL: 1.4 G/DL
ALP SERPL-CCNC: 92 U/L (ref 39–117)
ALT SERPL W P-5'-P-CCNC: 31 U/L (ref 1–33)
ANION GAP SERPL CALCULATED.3IONS-SCNC: 12.7 MMOL/L (ref 5–15)
AST SERPL-CCNC: 23 U/L (ref 1–32)
BASOPHILS # BLD AUTO: 0.04 10*3/MM3 (ref 0–0.2)
BASOPHILS NFR BLD AUTO: 0.5 % (ref 0–1.5)
BILIRUB SERPL-MCNC: 0.8 MG/DL (ref 0–1.2)
BUN SERPL-MCNC: 26 MG/DL (ref 8–23)
BUN/CREAT SERPL: 30.6 (ref 7–25)
CALCIUM SPEC-SCNC: 9.7 MG/DL (ref 8.6–10.5)
CHLORIDE SERPL-SCNC: 101 MMOL/L (ref 98–107)
CHOLEST SERPL-MCNC: 167 MG/DL (ref 0–200)
CO2 SERPL-SCNC: 26.3 MMOL/L (ref 22–29)
CREAT SERPL-MCNC: 0.85 MG/DL (ref 0.57–1)
DEPRECATED RDW RBC AUTO: 46.5 FL (ref 37–54)
EOSINOPHIL # BLD AUTO: 0.07 10*3/MM3 (ref 0–0.4)
EOSINOPHIL NFR BLD AUTO: 0.9 % (ref 0.3–6.2)
ERYTHROCYTE [DISTWIDTH] IN BLOOD BY AUTOMATED COUNT: 13.5 % (ref 12.3–15.4)
GFR SERPL CREATININE-BSD FRML MDRD: 64 ML/MIN/1.73
GLOBULIN UR ELPH-MCNC: 2.9 GM/DL
GLUCOSE SERPL-MCNC: 171 MG/DL (ref 65–99)
HBA1C MFR BLD: 8.1 % (ref 4.8–5.6)
HCT VFR BLD AUTO: 44.1 % (ref 34–46.6)
HDLC SERPL-MCNC: 48 MG/DL (ref 40–60)
HGB BLD-MCNC: 15.1 G/DL (ref 12–15.9)
IMM GRANULOCYTES # BLD AUTO: 0.02 10*3/MM3 (ref 0–0.05)
IMM GRANULOCYTES NFR BLD AUTO: 0.3 % (ref 0–0.5)
LDLC SERPL CALC-MCNC: 82 MG/DL (ref 0–100)
LDLC/HDLC SERPL: 1.54 {RATIO}
LYMPHOCYTES # BLD AUTO: 2.55 10*3/MM3 (ref 0.7–3.1)
LYMPHOCYTES NFR BLD AUTO: 34.4 % (ref 19.6–45.3)
MCH RBC QN AUTO: 33 PG (ref 26.6–33)
MCHC RBC AUTO-ENTMCNC: 34.2 G/DL (ref 31.5–35.7)
MCV RBC AUTO: 96.3 FL (ref 79–97)
MONOCYTES # BLD AUTO: 0.71 10*3/MM3 (ref 0.1–0.9)
MONOCYTES NFR BLD AUTO: 9.6 % (ref 5–12)
NEUTROPHILS NFR BLD AUTO: 4.02 10*3/MM3 (ref 1.7–7)
NEUTROPHILS NFR BLD AUTO: 54.3 % (ref 42.7–76)
NRBC BLD AUTO-RTO: 0.1 /100 WBC (ref 0–0.2)
PLATELET # BLD AUTO: 213 10*3/MM3 (ref 140–450)
PMV BLD AUTO: 10.1 FL (ref 6–12)
POTASSIUM SERPL-SCNC: 3.8 MMOL/L (ref 3.5–5.2)
PROT SERPL-MCNC: 7 G/DL (ref 6–8.5)
RBC # BLD AUTO: 4.58 10*6/MM3 (ref 3.77–5.28)
SODIUM SERPL-SCNC: 140 MMOL/L (ref 136–145)
TRIGL SERPL-MCNC: 225 MG/DL (ref 0–150)
TSH SERPL DL<=0.05 MIU/L-ACNC: 4.48 UIU/ML (ref 0.27–4.2)
URATE SERPL-MCNC: 5.6 MG/DL (ref 2.4–5.7)
VIT B12 BLD-MCNC: >2000 PG/ML (ref 211–946)
VLDLC SERPL-MCNC: 37 MG/DL (ref 5–40)
WBC NRBC COR # BLD: 7.41 10*3/MM3 (ref 3.4–10.8)

## 2022-02-21 PROCEDURE — 36415 COLL VENOUS BLD VENIPUNCTURE: CPT

## 2022-02-21 PROCEDURE — 82607 VITAMIN B-12: CPT

## 2022-02-21 PROCEDURE — 83036 HEMOGLOBIN GLYCOSYLATED A1C: CPT

## 2022-02-21 PROCEDURE — 80061 LIPID PANEL: CPT

## 2022-02-21 PROCEDURE — 84443 ASSAY THYROID STIM HORMONE: CPT

## 2022-02-21 PROCEDURE — 84550 ASSAY OF BLOOD/URIC ACID: CPT

## 2022-02-21 PROCEDURE — 85025 COMPLETE CBC W/AUTO DIFF WBC: CPT

## 2022-02-21 PROCEDURE — 80053 COMPREHEN METABOLIC PANEL: CPT

## 2022-02-21 PROCEDURE — 82043 UR ALBUMIN QUANTITATIVE: CPT

## 2022-02-21 PROCEDURE — 82306 VITAMIN D 25 HYDROXY: CPT

## 2022-02-21 RX ORDER — DULOXETIN HYDROCHLORIDE 60 MG/1
60 CAPSULE, DELAYED RELEASE ORAL DAILY
Qty: 90 CAPSULE | Refills: 1 | Status: SHIPPED | OUTPATIENT
Start: 2022-02-21 | End: 2022-08-05

## 2022-02-22 ENCOUNTER — TELEPHONE (OUTPATIENT)
Dept: FAMILY MEDICINE CLINIC | Facility: CLINIC | Age: 82
End: 2022-02-22

## 2022-02-22 NOTE — TELEPHONE ENCOUNTER
My chart message sent to the patient Uric acid and urine micro WNL. TSH elevated start synthroid 25mcg qd and recheck tsh in 6 wks. Lipid shows trig elevated-decrease carb and sugar intake. Vit D and CBC normal. CMP shows elevated glucose . B12 elevated if she is taking daily switch to qod. Where would you like the medication called into local or mail order?

## 2022-02-22 NOTE — TELEPHONE ENCOUNTER
----- Message from ALBER Candelaria sent at 2/22/2022  7:58 AM EST -----  A1c 8.1% which is up from 7.1%-she is on glipizide-states she could not tolerate metformin-we can try her on ozempic 0.25mg subcutaneous weekly x 4 wks-have her keep a blood sugar log and scheduled follow up in 1m.

## 2022-02-22 NOTE — TELEPHONE ENCOUNTER
----- Message from ALBER Candelaria sent at 2/21/2022  8:56 PM EST -----  Uric acid and urine micro WNL. TSH elevated start synthroid 25mcg qd and recheck tsh in 6 wks. Lipid shows trig elevated-decrease carb and sugar intake. Vit D and CBC normal. CMP shows elevated glucose add a1c. B12 elevated if she is taking daily switch to qod.

## 2022-02-23 DIAGNOSIS — E03.9 HYPOTHYROIDISM, UNSPECIFIED TYPE: ICD-10-CM

## 2022-02-23 DIAGNOSIS — E11.9 TYPE 2 DIABETES MELLITUS WITHOUT COMPLICATION, WITHOUT LONG-TERM CURRENT USE OF INSULIN: Primary | ICD-10-CM

## 2022-02-23 RX ORDER — LEVOTHYROXINE SODIUM 0.03 MG/1
25 TABLET ORAL DAILY
Qty: 90 TABLET | Refills: 0 | Status: SHIPPED | OUTPATIENT
Start: 2022-02-23 | End: 2022-05-11

## 2022-03-10 DIAGNOSIS — I10 PRIMARY HYPERTENSION: ICD-10-CM

## 2022-03-10 DIAGNOSIS — M1A.9XX0 CHRONIC GOUT WITHOUT TOPHUS, UNSPECIFIED CAUSE, UNSPECIFIED SITE: ICD-10-CM

## 2022-03-10 RX ORDER — ISOSORBIDE MONONITRATE 60 MG/1
60 TABLET, EXTENDED RELEASE ORAL DAILY
Qty: 90 TABLET | Refills: 1 | Status: SHIPPED | OUTPATIENT
Start: 2022-03-10 | End: 2022-09-16 | Stop reason: SDUPTHER

## 2022-03-10 RX ORDER — ALLOPURINOL 300 MG/1
300 TABLET ORAL DAILY
Qty: 90 TABLET | Refills: 1 | Status: SHIPPED | OUTPATIENT
Start: 2022-03-10 | End: 2022-09-19 | Stop reason: SDUPTHER

## 2022-03-10 RX ORDER — METOPROLOL SUCCINATE 50 MG/1
50 TABLET, EXTENDED RELEASE ORAL DAILY
Qty: 90 TABLET | Refills: 1 | Status: SHIPPED | OUTPATIENT
Start: 2022-03-10

## 2022-03-22 ENCOUNTER — OFFICE VISIT (OUTPATIENT)
Dept: FAMILY MEDICINE CLINIC | Facility: CLINIC | Age: 82
End: 2022-03-22

## 2022-03-22 VITALS
WEIGHT: 158 LBS | HEIGHT: 62 IN | BODY MASS INDEX: 29.08 KG/M2 | OXYGEN SATURATION: 93 % | HEART RATE: 86 BPM | SYSTOLIC BLOOD PRESSURE: 117 MMHG | DIASTOLIC BLOOD PRESSURE: 76 MMHG

## 2022-03-22 DIAGNOSIS — J30.2 SEASONAL ALLERGIES: ICD-10-CM

## 2022-03-22 DIAGNOSIS — E11.9 TYPE 2 DIABETES MELLITUS WITHOUT COMPLICATION, WITHOUT LONG-TERM CURRENT USE OF INSULIN: ICD-10-CM

## 2022-03-22 DIAGNOSIS — M19.90 ARTHRITIS: ICD-10-CM

## 2022-03-22 DIAGNOSIS — R53.1 WEAKNESS: ICD-10-CM

## 2022-03-22 DIAGNOSIS — L60.0 INGROWN TOENAIL: Primary | ICD-10-CM

## 2022-03-22 DIAGNOSIS — I10 PRIMARY HYPERTENSION: ICD-10-CM

## 2022-03-22 PROCEDURE — 99214 OFFICE O/P EST MOD 30 MIN: CPT | Performed by: NURSE PRACTITIONER

## 2022-03-22 RX ORDER — CETIRIZINE HYDROCHLORIDE 10 MG/1
10 TABLET ORAL DAILY
Qty: 90 TABLET | Refills: 1 | Status: SHIPPED | OUTPATIENT
Start: 2022-03-22 | End: 2022-04-05 | Stop reason: SDUPTHER

## 2022-03-22 NOTE — PROGRESS NOTES
"Chief Complaint  Gout (Allopurinol/), Hyperlipidemia (Atorvastatin), Back Pain (Diclofenac Gel, Flexeril), Anxiety (Depression, Duloxetine), Hypertension (Lasix, Imdur, metoprolol), Hypothyroidism (Levothyroxine), Arthritis (Naproxen), and Diabetes (Ozempic, Glipizide, last A1C 8.10 on 2/21/22)    Subjective          Maritza Whitmore presents to [facility]  History of Present Illness     Ingrown toenail b/l great toes-reports the left is worse than the right. Reports she digs them out and they are painful-referred to podiatry.     DM-reports he diet is not the best. She brought a bs log today her bs running 104-144. a1c was 8.10%. Referred to diabetic educator.     Reports before her open heart surgery she ate a healthy diet of fruits and veg and drank a lot of water but reports since the surgery her diet is poor-    htn-she brought a bp log to her appt today-her bp is running 93/150/64-87-most of her bp readings are within goal.     Seasonal allergies-states when she was outside yesterday her allergies started draining and has been draining ever since.     States she is thinking about moving back to [place] or [place] because she does not have the family support she thought she would have. States her son and daughter n law do help with the medical aspect of her care but nothing else. States she walked to the [employer] yesterday which was about a mile away because she needed some things.     Arthritis-c/o joint pain-recommended otc Tylenol Arthritis.       She  has a past medical history of Anxiety, Arthritis, Cataract, Congestive heart failure (HCC), Depression, Diabetes mellitus (HCC), Emphysema lung (HCC), Forgetfulness, Hernia cerebri (HCC), High blood pressure, High cholesterol, Kidney stones, Night sweats, Psychiatric complaint, Shortness of breath, and Sinus trouble.     Objective   Vital Signs:   /76   Pulse 86   Ht 157.5 cm (62\")   Wt 71.7 kg (158 lb)   SpO2 93%  "  BMI 28.90 kg/m²     Physical Exam  Constitutional:       Appearance: Normal appearance.   Neck:      Thyroid: No thyroid mass, thyromegaly or thyroid tenderness.      Vascular: No carotid bruit.   Cardiovascular:      Rate and Rhythm: Normal rate and regular rhythm.      Pulses: Normal pulses.      Heart sounds: Normal heart sounds.   Pulmonary:      Effort: Pulmonary effort is normal.      Breath sounds: Normal breath sounds.   Musculoskeletal:      Right lower leg: No edema.      Left lower leg: No edema.   Skin:     General: Skin is warm and dry.   Neurological:      General: No focal deficit present.      Mental Status: She is alert and oriented to person, place, and time.   Psychiatric:         Mood and Affect: Mood normal.         Behavior: Behavior normal.        Result Review :   The following data was reviewed by: ALBER Candelaria on 03/22/2022:  CMP    CMP 2/21/22   Glucose 171 (A)   BUN 26 (A)   Creatinine 0.85   eGFR Non African Am 64   Sodium 140   Potassium 3.8   Chloride 101   Calcium 9.7   Albumin 4.10   Total Bilirubin 0.8   Alkaline Phosphatase 92   AST (SGOT) 23   ALT (SGPT) 31   (A) Abnormal value            CBC    CBC 2/21/22   WBC 7.41   RBC 4.58   Hemoglobin 15.1   Hematocrit 44.1   MCV 96.3   MCH 33.0   MCHC 34.2   RDW 13.5   Platelets 213           CBC w/diff    CBC w/Diff 2/21/22   WBC 7.41   RBC 4.58   Hemoglobin 15.1   Hematocrit 44.1   MCV 96.3   MCH 33.0   MCHC 34.2   RDW 13.5   Platelets 213   Neutrophil Rel % 54.3   Immature Granulocyte Rel % 0.3   Lymphocyte Rel % 34.4   Monocyte Rel % 9.6   Eosinophil Rel % 0.9   Basophil Rel % 0.5           Lipid Panel    Lipid Panel 2/21/22   Total Cholesterol 167   Triglycerides 225 (A)   HDL Cholesterol 48   VLDL Cholesterol 37   LDL Cholesterol  82   LDL/HDL Ratio 1.54   (A) Abnormal value            TSH    TSH 2/21/22   TSH 4.480 (A)   (A) Abnormal value            Most Recent A1C    HGBA1C Most Recent 2/21/22   Hemoglobin A1C  8.10 (A)   (A) Abnormal value            Microalbumin    Microalbumin 2/21/22   Microalbumin, Urine 1.6                    Past Surgical History:   Procedure Laterality Date   • BACK SURGERY      30 years ago    • CARDIAC SURGERY     • GALLBLADDER SURGERY      30 year ago   • HYSTERECTOMY     • LAPAROSCOPIC GASTRIC BANDING      10 years ago      Family History   Problem Relation Age of Onset   • Heart disease Mother    • Diabetes Mother    • Cancer Brother         Current Outpatient Medications:   •  allopurinol (ZYLOPRIM) 300 MG tablet, Take 1 tablet by mouth Daily., Disp: 90 tablet, Rfl: 1  •  aspirin 81 MG EC tablet, Take 1 tablet by mouth Daily., Disp: 90 tablet, Rfl: 1  •  atorvastatin (LIPITOR) 80 MG tablet, Take 1 tablet by mouth Daily., Disp: 90 tablet, Rfl: 1  •  B Complex Vitamins (B-Complex/B-12) tablet, Take 1 tablet by mouth Daily., Disp: 90 each, Rfl: 1  •  calcium carbonate (OS-RANI) 600 MG tablet, Take 1 tablet by mouth Daily., Disp: 90 tablet, Rfl: 1  •  calcium citrate-vitamin d (CITRACAL) 200-250 MG-UNIT tablet tablet, Take 1 tablet by mouth Daily., Disp: 90 tablet, Rfl: 1  •  cyclobenzaprine (FLEXERIL) 10 MG tablet, Take 1 tablet by mouth 3 (Three) Times a Day As Needed for Muscle Spasms., Disp: 20 tablet, Rfl: 0  •  Diclofenac Sodium (VOLTAREN) 1 % gel gel, Apply 4 g topically to the appropriate area as directed 2 (Two) Times a Day., Disp: 350 g, Rfl: 0  •  DULoxetine (CYMBALTA) 60 MG capsule, Take 1 capsule by mouth Daily., Disp: 90 capsule, Rfl: 1  •  furosemide (LASIX) 80 MG tablet, Take 1 tablet by mouth 2 (Two) Times a Day., Disp: 90 tablet, Rfl: 1  •  glipizide (GLUCOTROL) 5 MG tablet, Take 1 tablet by mouth Daily., Disp: 90 tablet, Rfl: 1  •  isosorbide mononitrate (IMDUR) 60 MG 24 hr tablet, Take 1 tablet by mouth Daily., Disp: 90 tablet, Rfl: 1  •  levothyroxine (Synthroid) 25 MCG tablet, Take 1 tablet by mouth Daily., Disp: 90 tablet, Rfl: 0  •  Magnesium 400 MG capsule, Take 400 mg by  mouth Daily., Disp: 90 capsule, Rfl: 1  •  metoprolol succinate XL (TOPROL-XL) 50 MG 24 hr tablet, Take 1 tablet by mouth Daily., Disp: 90 tablet, Rfl: 1  •  multivitamin with minerals tablet tablet, Take 1 tablet by mouth Daily., Disp: 90 each, Rfl: 0  •  potassium chloride (KLOR-CON) 20 MEQ packet, Take 20 mEq by mouth 2 (Two) Times a Day., Disp: 180 each, Rfl: 0  •  cetirizine (zyrTEC) 10 MG tablet, Take 1 tablet by mouth Daily., Disp: 90 tablet, Rfl: 1  •  Ozempic, 0.25 or 0.5 MG/DOSE, 2 MG/1.5ML solution pen-injector, INJECT 0.25MG              SUBCUTANEOUSLY INTO THE    APPROPRIATE AREA AS        DIRECTED ONCE WEEKLY, Disp: 1.5 mL, Rfl: 0   Assessment and Plan    Diagnoses and all orders for this visit:    1. Ingrown toenail (Primary)  Comments:  Ingrown toenail b/l great toes-reports the left is worse than the right. Reports she digs them out and they are painful-referred to podiatry.   Orders:  -     Ambulatory Referral to Podiatry    2. Seasonal allergies  Comments:  uncontrolled allergies-prescribed zyrtec 10mg qd.   Orders:  -     Discontinue: cetirizine (zyrTEC) 10 MG tablet; Take 1 tablet by mouth Daily.  Dispense: 90 tablet; Refill: 1    3. Type 2 diabetes mellitus without complication, without long-term current use of insulin (HCC)  Comments:  -reports he diet is not the best. She brought a bs log today her bs running 104-144. a1c was 8.10%. Referred to diabetic educator.   Orders:  -     Ambulatory Referral to Diabetic Education    4. Weakness  Comments:  c/o leg weakness-placed PT consult  Orders:  -     Ambulatory Referral to Physical Therapy Evaluate and treat; Strengthening    5. Primary hypertension  Comments:  -she brought a bp log to her appt today-her bp is running 93/150/64-87-most of her bp readings are within goal.     6. Arthritis  Comments:  c/o joint pain-recommended otc Tylenol Arthritis.       Follow Up   Return in about 3 months (around 6/22/2022).  Patient was given instructions and  counseling regarding her condition or for health maintenance advice. Please see specific information pulled into the AVS if appropriate.     Maritza Whitmore  reports that she has never smoked. She has never used smokeless tobacco..             ALBER Candelaria    The patient is advised to continue current medications.

## 2022-03-31 ENCOUNTER — TELEPHONE (OUTPATIENT)
Dept: FAMILY MEDICINE CLINIC | Facility: CLINIC | Age: 82
End: 2022-03-31

## 2022-03-31 ENCOUNTER — EDUCATION (OUTPATIENT)
Dept: DIABETES SERVICES | Facility: HOSPITAL | Age: 82
End: 2022-03-31

## 2022-03-31 DIAGNOSIS — IMO0002 DIABETES MELLITUS TYPE 2, UNCONTROLLED, WITH COMPLICATIONS: Primary | ICD-10-CM

## 2022-03-31 DIAGNOSIS — J30.2 SEASONAL ALLERGIES: ICD-10-CM

## 2022-03-31 PROCEDURE — G0108 DIAB MANAGE TRN  PER INDIV: HCPCS

## 2022-04-04 NOTE — TELEPHONE ENCOUNTER
Phoned the patient she states she was causing her head to itch and then it went all over her body this happens off and on, she states the tips of her fingers are peeling and her feet are peeling too.

## 2022-04-05 RX ORDER — CETIRIZINE HYDROCHLORIDE 10 MG/1
10 TABLET ORAL DAILY
Qty: 90 TABLET | Refills: 1 | Status: SHIPPED | OUTPATIENT
Start: 2022-04-05

## 2022-04-07 DIAGNOSIS — E11.9 TYPE 2 DIABETES MELLITUS WITHOUT COMPLICATION, WITHOUT LONG-TERM CURRENT USE OF INSULIN: ICD-10-CM

## 2022-04-07 RX ORDER — SEMAGLUTIDE 1.34 MG/ML
INJECTION, SOLUTION SUBCUTANEOUS
Qty: 1.5 ML | Refills: 0 | Status: SHIPPED | OUTPATIENT
Start: 2022-04-07 | End: 2022-05-26

## 2022-04-19 ENCOUNTER — TELEPHONE (OUTPATIENT)
Dept: FAMILY MEDICINE CLINIC | Facility: CLINIC | Age: 82
End: 2022-04-19

## 2022-04-28 ENCOUNTER — LAB (OUTPATIENT)
Dept: LAB | Facility: HOSPITAL | Age: 82
End: 2022-04-28

## 2022-04-28 DIAGNOSIS — E11.9 TYPE 2 DIABETES MELLITUS WITHOUT COMPLICATION, WITHOUT LONG-TERM CURRENT USE OF INSULIN: ICD-10-CM

## 2022-04-28 DIAGNOSIS — E03.9 HYPOTHYROIDISM, UNSPECIFIED TYPE: ICD-10-CM

## 2022-04-28 LAB
HBA1C MFR BLD: 6.6 % (ref 4.8–5.6)
TSH SERPL DL<=0.05 MIU/L-ACNC: 2.5 UIU/ML (ref 0.27–4.2)

## 2022-04-28 PROCEDURE — 36415 COLL VENOUS BLD VENIPUNCTURE: CPT

## 2022-04-28 PROCEDURE — 84443 ASSAY THYROID STIM HORMONE: CPT

## 2022-04-28 PROCEDURE — 83036 HEMOGLOBIN GLYCOSYLATED A1C: CPT

## 2022-04-29 ENCOUNTER — TELEPHONE (OUTPATIENT)
Dept: FAMILY MEDICINE CLINIC | Facility: CLINIC | Age: 82
End: 2022-04-29

## 2022-04-29 NOTE — TELEPHONE ENCOUNTER
----- Message from ALBER Candelaria sent at 4/28/2022 10:19 PM EDT -----  A1c much improved from last check-she went from 8.10 to 6.60% keep up the good work and recheck a1c in 3m. TSH now normal cont current dose of synthroid

## 2022-05-03 ENCOUNTER — EDUCATION (OUTPATIENT)
Dept: DIABETES SERVICES | Facility: HOSPITAL | Age: 82
End: 2022-05-03

## 2022-05-03 DIAGNOSIS — E11.8 TYPE 2 DIABETES MELLITUS WITH UNSPECIFIED COMPLICATIONS: Primary | ICD-10-CM

## 2022-05-03 PROCEDURE — G0108 DIAB MANAGE TRN  PER INDIV: HCPCS

## 2022-05-03 NOTE — PROGRESS NOTES
Maritza Beck 81 y.o. presents for follow-up visit.            Education Plan:       Blood Glucose Monitoring Instructions and Plan:   We reviewed blood glucose testing and ADA recommended blood glucose level for fasting, pre and post meals. Patient demonstrates understanding and importance of testing blood glucose 1-2 times a day; Patient will log BG results. Patient was given written material including blood glucose log.     Initial Nutrition Instructions and Plan:   Food label was reviewed with patient. Serving size and carbohydrate amounts were emphasized.   45 carbs per meal 3 meals a day  15-20 carbs per snack 3 snacks per day.   Medication Instructions and Plan:     Current Outpatient Medications:   •  allopurinol (ZYLOPRIM) 300 MG tablet, Take 1 tablet by mouth Daily., Disp: 90 tablet, Rfl: 1  •  aspirin 81 MG EC tablet, Take 1 tablet by mouth Daily., Disp: 90 tablet, Rfl: 1  •  atorvastatin (LIPITOR) 80 MG tablet, Take 1 tablet by mouth Daily., Disp: 90 tablet, Rfl: 1  •  B Complex Vitamins (B-Complex/B-12) tablet, Take 1 tablet by mouth Daily., Disp: 90 each, Rfl: 1  •  calcium carbonate (OS-RANI) 600 MG tablet, Take 1 tablet by mouth Daily., Disp: 90 tablet, Rfl: 1  •  calcium citrate-vitamin d (CITRACAL) 200-250 MG-UNIT tablet tablet, Take 1 tablet by mouth Daily., Disp: 90 tablet, Rfl: 1  •  cetirizine (zyrTEC) 10 MG tablet, Take 1 tablet by mouth Daily., Disp: 90 tablet, Rfl: 1  •  cyclobenzaprine (FLEXERIL) 10 MG tablet, Take 1 tablet by mouth 3 (Three) Times a Day As Needed for Muscle Spasms., Disp: 20 tablet, Rfl: 0  •  Diclofenac Sodium (VOLTAREN) 1 % gel gel, Apply 4 g topically to the appropriate area as directed 2 (Two) Times a Day., Disp: 350 g, Rfl: 0  •  DULoxetine (CYMBALTA) 60 MG capsule, Take 1 capsule by mouth Daily., Disp: 90 capsule, Rfl: 1  •  furosemide (LASIX) 80 MG tablet, Take 1 tablet by mouth 2 (Two) Times a Day., Disp: 90 tablet, Rfl: 1  •  glipizide (GLUCOTROL) 5 MG tablet, Take  1 tablet by mouth Daily., Disp: 90 tablet, Rfl: 1  •  isosorbide mononitrate (IMDUR) 60 MG 24 hr tablet, Take 1 tablet by mouth Daily., Disp: 90 tablet, Rfl: 1  •  levothyroxine (Synthroid) 25 MCG tablet, Take 1 tablet by mouth Daily., Disp: 90 tablet, Rfl: 0  •  Magnesium 400 MG capsule, Take 400 mg by mouth Daily., Disp: 90 capsule, Rfl: 1  •  metoprolol succinate XL (TOPROL-XL) 50 MG 24 hr tablet, Take 1 tablet by mouth Daily., Disp: 90 tablet, Rfl: 1  •  multivitamin with minerals tablet tablet, Take 1 tablet by mouth Daily., Disp: 90 each, Rfl: 0  •  Ozempic, 0.25 or 0.5 MG/DOSE, 2 MG/1.5ML solution pen-injector, INJECT 0.25MG              SUBCUTANEOUSLY INTO THE    APPROPRIATE AREA AS        DIRECTED ONCE WEEKLY, Disp: 1.5 mL, Rfl: 0  •  potassium chloride (KLOR-CON) 20 MEQ packet, Take 20 mEq by mouth 2 (Two) Times a Day., Disp: 180 each, Rfl: 0   Medication list was reviewed with patient. Patient denies questions or concerns regarding current medication therapy. Patient was instructed to continue medications as prescribed.     Exercise:   Patient has been instructed to walk for 10 minutes after each meal initially and build strength and endurance to achieve 30 minutes of sustained exercise per day; recommended patient seek advice from Primary Care Provider prior to beginning any exercise program if other health concerns exist.     Problem solving and reducing risks:   I explained the importance of keeping provider appointments, taking medications as prescribed, having laboratory work performed as ordered by provider and seeking care as soon as possible when complications occur.     Follow up Instructions and Plan:Patient was encouraged to contact DSME staff with questions and or concerns.  DSME staff contact information was given to patient.  Patient will be contacted when group classes resume.      Other:Patient verbalized understanding of food label, monitoring blood glucose, and taking medications as  prescribed.    Start Time:3:05  End Time: 3:35    Ely Duarte RN, BSN  05/03/2022

## 2022-05-11 DIAGNOSIS — E03.9 HYPOTHYROIDISM, UNSPECIFIED TYPE: ICD-10-CM

## 2022-05-11 RX ORDER — LEVOTHYROXINE SODIUM 25 MCG
TABLET ORAL
Qty: 90 TABLET | Refills: 0 | Status: SHIPPED | OUTPATIENT
Start: 2022-05-11 | End: 2022-08-05

## 2022-05-25 ENCOUNTER — TREATMENT (OUTPATIENT)
Dept: PHYSICAL THERAPY | Facility: CLINIC | Age: 82
End: 2022-05-25

## 2022-05-25 DIAGNOSIS — R29.898 WEAKNESS OF RIGHT LOWER EXTREMITY: ICD-10-CM

## 2022-05-25 DIAGNOSIS — R29.898 WEAKNESS OF LEFT LOWER EXTREMITY: ICD-10-CM

## 2022-05-25 DIAGNOSIS — R26.9 GAIT DISTURBANCE: ICD-10-CM

## 2022-05-25 DIAGNOSIS — E11.9 TYPE 2 DIABETES MELLITUS WITHOUT COMPLICATION, WITHOUT LONG-TERM CURRENT USE OF INSULIN: ICD-10-CM

## 2022-05-25 DIAGNOSIS — R53.1 WEAKNESS: Primary | ICD-10-CM

## 2022-05-25 PROCEDURE — 97162 PT EVAL MOD COMPLEX 30 MIN: CPT | Performed by: PHYSICAL THERAPIST

## 2022-05-25 PROCEDURE — 97110 THERAPEUTIC EXERCISES: CPT | Performed by: PHYSICAL THERAPIST

## 2022-05-25 NOTE — PROGRESS NOTES
Physical Therapy Initial Evaluation and Plan of Care    Patient: Maritza Whitmore   : 1940  Diagnosis/ICD-10 Code:  Weakness [R53.1]  Referring practitioner: Jia Ellison, *  Date of Initial Visit: 2022  Today's Date: 2022  Patient seen for 1 sessions           Subjective Questionnaire: LEFS: 38/80 = 52% limitation      Subjective Evaluation    History of Present Illness  Mechanism of injury: Pt presents to physical therapy with complaints of weakness in her legs that she started to notice about 3 months ago. She reports having had back surgery for a herniated disc many years ago. She also has arthritis. Her pain she describes in the knees and runs down the back of both legs at times. Worst in the morning. She moved her from [place] about 6 months ago, lives alone in a first story apartment.     PMH: CABG, diabetes, neuropathy    Pain  Current pain ratin  At best pain ratin  At worst pain rating: 10  Quality: tight and dull ache  Relieving factors: change in position and rest  Aggravating factors: ambulation and repetitive movement    Social Support  Lives in: one-story house  Lives with: alone    Patient Goals  Patient goals for therapy: decreased pain, improved balance, increased motion and increased strength             Objective          Strength/Myotome Testing     Left Hip   Planes of Motion   Flexion: 4-  Extension: 4-  Abduction: 4-  Adduction: 4    Right Hip   Planes of Motion   Flexion: 4+  Extension: 4  Abduction: 4  Adduction: 4+    Left Knee   Flexion: 4  Extension: 4    Right Knee   Flexion: 4+  Extension: 4+    Left Ankle/Foot   Dorsiflexion: 5  Plantar flexion: 4    Right Ankle/Foot   Dorsiflexion: 5  Plantar flexion: 4    Tests       Thoracic   Positive slump.     Ambulation     Comments   Slowed gait, decreased trunk dissociation    Functional Assessment     Comments  30 second Sit To Stand Test: 13 repetitions (Average 9-14)      See Exercise, Manual, and Modality  "Logs for complete treatment.     Assessment & Plan     Assessment  Impairments: abnormal gait, abnormal muscle firing, abnormal or restricted ROM, activity intolerance, impaired balance, impaired physical strength, pain with function and weight-bearing intolerance  Functional Limitations: walking, standing and stooping  Assessment details: The patient presents to physical therapy with complaints of weakness in her lower extremities, she has a history of back problems and reports having arthritis \"everywhere.\" The patient presents with associated hip/LE weakness, stiffness, and functional deficits (LEFS). The patient would benefit from skilled PT intervention to address the above mentioned functional limitations. Plan to progress with aquatic therapy x4 weeks, then she is to transition to the gym pool and progress with land therapy for another x4 weeks to establish a HEP.     Prognosis: good    Goals  Plan Goals:     1. The patient complains of lower back/ hip/LE pain.   LTG 1: 12 weeks:  The patient will report a pain rating of 2/10 or better in order to improve  tolerance to activities of daily living and improve sleep quality.    STATUS:  New   STG 1a: 6 weeks:  The patient will report a pain rating of 4/10 or better.    STATUS:  New   TREATMENT:  Therapeutic exercises, manual therapy, aquatic therapy, home exercise   instruction, and modalities as needed for pain to include:  electrical stimulation, moist heat, ice,   ultrasound, and diathermy.    2. The patient demonstrates weakness of the bilateral hip/knees.   LTG 2: 12 weeks:  The patient will demonstrate 4+/5 strength for bilateral hip flexion, abduction,  and extension; knee extension, flexion in order to improve hip stability.    STATUS:  New   STG 2a: 6 weeks:  The patient will demonstrate 4/5 strength for left hip flexion, abduction,  and extension.    STATUS:  New   TREATMENT: Therapeutic exercises, manual therapy, aquatic therapy, home exercise " instruction,  and modalities as needed for pain to include:  electrical stimulation, moist heat, ice, and ultrasound    3. The patient has gait dysfunction.  LTG 3: 12 weeks:  The patient will ambulate without assistive device, independently, for community distances with minimal limp to the left lower extremity in order to improve mobility and allow patient to perform activities such as grocery shopping with greater ease.  STATUS:  New  STG 3a: The patient will be independent in HEP.  STATUS:  New  TREATMENT: Gait training, aquatic therapy, therapeutic exercise, and home exercise instruction.        4. Mobility: Walking/Moving Around Functional Limitation     LTG 4: 12 weeks:  The patient will demonstrate 1-19% limitation by achieving a score of 65-79 on the Lower Extremity Functional Scale.    STATUS:  New   STG 4a: 6 weeks:  The patient will demonstrate 20-39% limitation by achieving a score of 50-64 on the Lower Extremity Functional Scale.      STATUS:  New   LTG 4b: 12 weeks:  The patient will demonstrate >13 repetitions on the 30 second sit to stand test for improved endurance and functional mobility.    STATUS:  New   TREATMENT:  Manual therapy, therapeutic exercise, home exercise instruction, and modalities as needed to include: moist heat, electrical stimulation, and ultrasound.         Plan  Therapy options: will be seen for skilled therapy services  Planned modality interventions: TENS, cryotherapy and thermotherapy (hydrocollator packs)  Other planned modality interventions: aquatic therapy  Planned therapy interventions: functional ROM exercises, gait training, home exercise program, manual therapy, strengthening, stretching, therapeutic activities, soft tissue mobilization, joint mobilization, neuromuscular re-education and balance/weight-bearing training  Frequency: 3x week  Duration in weeks: 12  Treatment plan discussed with: patient        Visit Diagnoses:    ICD-10-CM ICD-9-CM   1. Weakness  R53.1  780.79   2. Weakness of left lower extremity  R29.898 729.89   3. Weakness of right lower extremity  R29.898 729.89   4. Gait disturbance  R26.9 781.2       History # of Personal Factors and/or Comorbidities: HIGH (3+)  Examination of Body System(s): # of elements: MODERATE (3)  Clinical Presentation: STABLE   Clinical Decision Making: MODERATE      Timed:         Manual Therapy:    0     mins  17170;     Therapeutic Exercise:    15     mins  88979;     Neuromuscular Amira:    0    mins  15785;    Therapeutic Activity:     0     mins  63440;     Gait Trainin     mins  32528;     Ultrasound:     0     mins  96871;    Ionto                               0    mins   40967  Self Care                       0     mins   44228  Canalith Repos    0     mins 56948      Un-Timed:  Electrical Stimulation:    0     mins  27654 ( );  Dry Needling     0     mins self-pay  Traction     0     mins 53347  Low Eval     0     Mins  43177  Mod Eval     40     Mins  16354  High Eval                       0     Mins  13047  Re-Eval                           0    mins  35757    Timed Treatment:   15   mins   Total Treatment:     55   mins    PT SIGNATURE: Koffi Lopez PT     Electronically signed 2022    [place] License: PT - [id number]     Initial Certification  Certification Period: 2022 thru 2022  I certify that the therapy services are furnished while this patient is under my care.  The services outlined above are required by this patient, and will be reviewed every 90 days.     PHYSICIAN: Jia Ellison APRN   NPI: [id number]                                        DATE:     Please sign and return via fax to [phone]. Thank you, [facility].

## 2022-05-26 RX ORDER — SEMAGLUTIDE 1.34 MG/ML
INJECTION, SOLUTION SUBCUTANEOUS
Qty: 1.5 ML | Refills: 2 | Status: SHIPPED | OUTPATIENT
Start: 2022-05-26 | End: 2022-07-13 | Stop reason: SDUPTHER

## 2022-06-02 ENCOUNTER — TREATMENT (OUTPATIENT)
Dept: PHYSICAL THERAPY | Facility: CLINIC | Age: 82
End: 2022-06-02

## 2022-06-02 DIAGNOSIS — R53.1 WEAKNESS: Primary | ICD-10-CM

## 2022-06-02 DIAGNOSIS — R26.9 GAIT DISTURBANCE: ICD-10-CM

## 2022-06-02 DIAGNOSIS — R29.898 WEAKNESS OF RIGHT LOWER EXTREMITY: ICD-10-CM

## 2022-06-02 DIAGNOSIS — R29.898 WEAKNESS OF LEFT LOWER EXTREMITY: ICD-10-CM

## 2022-06-02 PROCEDURE — 97113 AQUATIC THERAPY/EXERCISES: CPT | Performed by: PHYSICAL THERAPIST

## 2022-06-02 NOTE — PROGRESS NOTES
"Physical Therapy Daily Treatment Note      Patient: Maritza Whitmore   : 1940  Referring practitioner: Jia Ellison, *  Date of Initial Visit: Type: THERAPY  Noted: 2022  Today's Date: 2022  Patient seen for 2 sessions           Subjective  Maritza Whitmore reports: she wasn't having too much pain at arrival, 4/10. \"I have the most pain in the morning. I take Tylenol Arthritis but I guess I could use something stronger.\" Myrtle talked about walking her dogs and how that helps keep her moving.        Objective   See Exercise, Manual, and Modality Logs for complete treatment.       Assessment/Plan  This is Myrtle's first follow up after the initial evaluation. She requires continued care to aid return of mobility, strength and function to address pain control.     Visit Diagnoses:    ICD-10-CM ICD-9-CM   1. Weakness  R53.1 780.79   2. Weakness of left lower extremity  R29.898 729.89   3. Weakness of right lower extremity  R29.898 729.89   4. Gait disturbance  R26.9 781.2       Progress per Plan of Care and Progress strengthening /stabilization /functional activity           Timed:  Manual Therapy:         mins  20325;  Therapeutic Exercise:         mins  60955;     Neuromuscular Amira:        mins  77044;    Therapeutic Activity:          mins  98800;     Gait Training:           mins  25927;     Ultrasound:          mins  35658;    Electrical Stimulation:         mins  73387 ( );  Aquatics  _25_   mins   83840    Untimed:  Electrical Stimulation:         mins  97263 ( );  Mechanical Traction:         mins  64815;     Timed Treatment:   25   mins   Total Treatment:     25   mins    Electronically Signed:  Korin Doty PTA  Physical Therapist Assistant    [place] PTA license [id number]            "

## 2022-06-10 ENCOUNTER — TREATMENT (OUTPATIENT)
Dept: PHYSICAL THERAPY | Facility: CLINIC | Age: 82
End: 2022-06-10

## 2022-06-10 DIAGNOSIS — R53.1 WEAKNESS: Primary | ICD-10-CM

## 2022-06-10 DIAGNOSIS — R29.898 WEAKNESS OF LEFT LOWER EXTREMITY: ICD-10-CM

## 2022-06-10 DIAGNOSIS — R29.898 WEAKNESS OF RIGHT LOWER EXTREMITY: ICD-10-CM

## 2022-06-10 DIAGNOSIS — R26.9 GAIT DISTURBANCE: ICD-10-CM

## 2022-06-10 PROCEDURE — 97113 AQUATIC THERAPY/EXERCISES: CPT | Performed by: PHYSICAL THERAPIST

## 2022-06-10 NOTE — PROGRESS NOTES
Physical Therapy Daily Progress Note        Patient: Maritza Whitmore   : 1940  Diagnosis/ICD-10 Code:  Weakness [R53.1]  Referring practitioner: Jia Ellison, *  Date of Initial Visit: Type: THERAPY  Noted: 2022  Today's Date: 6/10/2022  Patient seen for 3 sessions             Subjective   Maritza Whitmore reports: back and legs bothering her in the mornings. States that she walks her dog 3-4 times a day and one of them pulls; she can feel the pain in her back when he's pulling. Reports the pain decreases as she rests, even feels like therapy has helped a little after 1 session.     Objective   No complaints of increased pain or discomfort.     See Exercise, Manual, and Modality Logs for complete treatment.       Assessment/Plan  Maritza still experiencing increased low back and hip pain, especially after walking her dogs. Pt tolerated aquatic exercises well, no complaints of increased pain or discomfort. Pt would benefit from skilled PT to address Range of Motion  and Strength deficits, pain management and any concerns with ADLs.     Progress per Plan of Care           Timed:  Manual Therapy:         mins  79122;  Therapeutic Exercise:         mins  49748;     Neuromuscular Amira:        mins  80275;    Therapeutic Activity:          mins  54314;     Gait Training:           mins  93964;    Aquatic Therapy:     30     mins  14547;       Untimed:  Electrical Stimulation:         mins  84241 ( );  Mechanical Traction:         mins  37206;       Timed Treatment:   30   mins   Total Treatment:     30   mins      Electronically signed:   Jennifer Lopez PTA  Physical Therapist Assistant  [facility] License #: [id number]

## 2022-06-14 ENCOUNTER — TREATMENT (OUTPATIENT)
Dept: PHYSICAL THERAPY | Facility: CLINIC | Age: 82
End: 2022-06-14

## 2022-06-14 DIAGNOSIS — R53.1 WEAKNESS: Primary | ICD-10-CM

## 2022-06-14 DIAGNOSIS — R29.898 WEAKNESS OF RIGHT LOWER EXTREMITY: ICD-10-CM

## 2022-06-14 DIAGNOSIS — R29.898 WEAKNESS OF LEFT LOWER EXTREMITY: ICD-10-CM

## 2022-06-14 DIAGNOSIS — R26.9 GAIT DISTURBANCE: ICD-10-CM

## 2022-06-14 PROCEDURE — 97113 AQUATIC THERAPY/EXERCISES: CPT | Performed by: PHYSICAL THERAPIST

## 2022-06-14 NOTE — PROGRESS NOTES
Physical Therapy Daily Progress Note        Patient: Maritza Whitmore   : 1940  Diagnosis/ICD-10 Code:  Weakness [R53.1]  Referring practitioner: Jia Ellison, *  Date of Initial Visit: Type: THERAPY  Noted: 2022  Today's Date: 2022  Patient seen for 4 sessions             Subjective   Maritza Whitmore reports: being a little more tired after last PT session. States that she is still having problems with her legs but feeling a little better overall, biggest complaint is still when she walks her dogs.     Objective   No complaints of increased pain or discomfort.     See Exercise, Manual, and Modality Logs for complete treatment.       Assessment/Plan  Maritza still experiencing increased B leg pain, although improve. Pt tolerated aquatic exercises well, no complaints of increased pain or discomfort. Pt would benefit from skilled PT to address Range of Motion  and Strength deficits, pain management and any concerns with ADLs.     Progress per Plan of Care           Timed:  Manual Therapy:         mins  75604;  Therapeutic Exercise:         mins  05375;     Neuromuscular Amira:        mins  63233;    Therapeutic Activity:          mins  58606;     Gait Training:           mins  85678;    Aquatic Therapy:     30     mins  53547;       Untimed:  Electrical Stimulation:         mins  42280 ( );  Mechanical Traction:         mins  82658;       Timed Treatment:   30   mins   Total Treatment:     30   mins      Electronically signed:   Jennifer Lopez PTA  Physical Therapist Assistant  [facility] License #: [id number]

## 2022-06-16 ENCOUNTER — TREATMENT (OUTPATIENT)
Dept: PHYSICAL THERAPY | Facility: CLINIC | Age: 82
End: 2022-06-16

## 2022-06-16 DIAGNOSIS — R29.898 WEAKNESS OF LEFT LOWER EXTREMITY: ICD-10-CM

## 2022-06-16 DIAGNOSIS — R29.898 WEAKNESS OF RIGHT LOWER EXTREMITY: ICD-10-CM

## 2022-06-16 DIAGNOSIS — R53.1 WEAKNESS: Primary | ICD-10-CM

## 2022-06-16 DIAGNOSIS — R26.9 GAIT DISTURBANCE: ICD-10-CM

## 2022-06-16 PROCEDURE — 97113 AQUATIC THERAPY/EXERCISES: CPT | Performed by: PHYSICAL THERAPIST

## 2022-06-16 NOTE — PROGRESS NOTES
Physical Therapy Daily Progress Note        Patient: Maritza Whitmore   : 1940  Diagnosis/ICD-10 Code:  Weakness [R53.1]  Referring practitioner: Jia Ellison, *  Date of Initial Visit: Type: THERAPY  Noted: 2022  Today's Date: 2022  Patient seen for 5 sessions             Subjective   Maritza Whitmore reports: back hurts the worst first thing in the morning and gets better after 2-3 hours. States that overall better compared to having pain all day long.     Objective   No complaints of increased pain with aquatic exercises.     See Exercise, Manual, and Modality Logs for complete treatment.       Assessment/Plan  Maritza progressing as evident by decreased overall back  pain, although still painful in the mornings. Pt tolerated exercises well, no complaints of increased pain or discomfort. Pt would benefit from skilled PT to address Range of Motion  and Strength deficits, pain management and any concerns with ADLs.       Progress per Plan of Care           Timed:  Manual Therapy:         mins  11303;  Therapeutic Exercise:         mins  19758;     Neuromuscular Amira:        mins  75759;    Therapeutic Activity:          mins  85719;     Gait Training:           mins  15865;    Aquatic Therapy:     23     mins  12157;       Untimed:  Electrical Stimulation:         mins  03951 ( );  Mechanical Traction:         mins  09174;       Timed Treatment:   23   mins   Total Treatment:     23   mins      Electronically signed:   Jennifer Lopez PTA  Physical Therapist Assistant  [place] [facility] License #: [id number]

## 2022-06-20 ENCOUNTER — TREATMENT (OUTPATIENT)
Dept: PHYSICAL THERAPY | Facility: CLINIC | Age: 82
End: 2022-06-20

## 2022-06-20 DIAGNOSIS — R26.9 GAIT DISTURBANCE: ICD-10-CM

## 2022-06-20 DIAGNOSIS — R53.1 WEAKNESS: Primary | ICD-10-CM

## 2022-06-20 DIAGNOSIS — R29.898 WEAKNESS OF RIGHT LOWER EXTREMITY: ICD-10-CM

## 2022-06-20 DIAGNOSIS — R29.898 WEAKNESS OF LEFT LOWER EXTREMITY: ICD-10-CM

## 2022-06-20 PROCEDURE — 97113 AQUATIC THERAPY/EXERCISES: CPT | Performed by: PHYSICAL THERAPIST

## 2022-06-20 NOTE — PROGRESS NOTES
Physical Therapy Daily Progress Note        Patient: Maritza Whitmore   : 1940  Diagnosis/ICD-10 Code:  Weakness [R53.1]  Referring practitioner: Jia Ellison, *  Date of Initial Visit: Type: THERAPY  Noted: 2022  Today's Date: 2022  Patient seen for 6 sessions             Subjective   Maritza Whitmore reports: feeling about the same, states that she's usually stiff first thing in the morning and get better throughout the day. States that she got a new leash for her dog so they aren't pulling as much.     Objective   No complaints of increased pain or discomfort.     See Exercise, Manual, and Modality Logs for complete treatment.       Assessment/Plan  Maritza progressing as evident by decreased overall back and B hip pain. Pt tolerated exercises well, no complaints of increased pain or discomfort. Pt would benefit from skilled PT to address Range of Motion  and Strength deficits, pain management and any concerns with ADLs.   .  Progress per Plan of Care         Timed:  Manual Therapy:         mins  72210;  Therapeutic Exercise:         mins  59190;     Neuromuscular Amira:        mins  99300;    Therapeutic Activity:          mins  52229;     Gait Training:           mins  66954;    Aquatic Therapy:     30     mins  61017;       Untimed:  Electrical Stimulation:         mins  13099 ( );  Mechanical Traction:         mins  62665;       Timed Treatment:   30   mins   Total Treatment:     30   mins      Electronically signed:   Jennifer Lopez PTA  Physical Therapist Assistant  [facility] License #: [id number]

## 2022-06-22 ENCOUNTER — TREATMENT (OUTPATIENT)
Dept: PHYSICAL THERAPY | Facility: CLINIC | Age: 82
End: 2022-06-22

## 2022-06-22 DIAGNOSIS — R29.898 WEAKNESS OF LEFT LOWER EXTREMITY: ICD-10-CM

## 2022-06-22 DIAGNOSIS — R26.9 GAIT DISTURBANCE: ICD-10-CM

## 2022-06-22 DIAGNOSIS — R53.1 WEAKNESS: Primary | ICD-10-CM

## 2022-06-22 DIAGNOSIS — R29.898 WEAKNESS OF RIGHT LOWER EXTREMITY: ICD-10-CM

## 2022-06-22 PROCEDURE — 97113 AQUATIC THERAPY/EXERCISES: CPT | Performed by: PHYSICAL THERAPIST

## 2022-06-22 NOTE — PROGRESS NOTES
Progress Assessment        Patient: Maritza Whitmore   : 1940  Diagnosis/ICD-10 Code:  Weakness [R53.1]  Referring practitioner: Jia Elliosn, *  Date of Initial Visit: Type: THERAPY  Noted: 2022  Today's Date: 2022  Patient seen for 7 sessions      Subjective:     Subjective Questionnaire: LEFS: 56/80  Clinical Progress: improved  Home Program Compliance: Yes  Treatment has included: therapeutic exercise, therapeutic activity and aquatic therapy    Subjective Evaluation    History of Present Illness  Mechanism of injury: Pt states she is feeling better with the aquatic therapy thus far, feeling stronger, but  primarily having pain and stiffness first thing in the morning. She is in agreement with the plan to transition to land therapy following the next aquatic treatment session. She will be seeing her referring physician next week.    Pain  Current pain ratin         Objective     Strength/Myotome Testing      Left Hip   Planes of Motion   Flexion: 4  Extension: 4-  Abduction: 4-  Adduction: 4+     Right Hip   Planes of Motion   Flexion: 5  Extension: 4+  Abduction: 4  Adduction: 4+     Left Knee   Flexion: 4  Extension: 4     Right Knee   Flexion: 5  Extension: 5     Left Ankle/Foot   Dorsiflexion: 5  Plantar flexion: 4     Right Ankle/Foot   Dorsiflexion: 5  Plantar flexion: 4+              Assessment & Plan     Assessment  Impairments: abnormal gait, abnormal muscle firing, abnormal or restricted ROM, activity intolerance, impaired balance, impaired physical strength, pain with function and weight-bearing intolerance  Functional Limitations: walking, standing and stooping  Assessment details: The patient presents to physical therapy with complaints of weakness in her lower extremities, she is still very weak in her left hip which may have contributions from the lower back. She may benefit from a spine referral to assess baseline since she has moved from [place]. Her strength has  improved overall, but the left hip abductors are very weak. The patient presents with associated hip/LE weakness, stiffness, and functional deficits (LEFS). The patient would benefit from ongoing skilled PT intervention to address the above mentioned functional limitations.      Prognosis: good    Goals  Plan Goals:     1. The patient complains of lower back/ hip/LE pain.   LTG 1: 12 weeks:  The patient will report a pain rating of 2/10 or better in order to improve  tolerance to activities of daily living and improve sleep quality.    STATUS: Not met, progressing   STG 1a: 6 weeks:  The patient will report a pain rating of 4/10 or better.    STATUS:  Not met, progressing   TREATMENT:  Therapeutic exercises, manual therapy, aquatic therapy, home exercise   instruction, and modalities as needed for pain to include:  electrical stimulation, moist heat, ice,   ultrasound, and diathermy.    2. The patient demonstrates weakness of the bilateral hip/knees.   LTG 2: 12 weeks:  The patient will demonstrate 4+/5 strength for bilateral hip flexion, abduction,  and extension; knee extension, flexion in order to improve hip stability.    STATUS:  Not met, progressing   STG 2a: 6 weeks:  The patient will demonstrate 4/5 strength for left hip flexion, abduction,  and extension.    STATUS:  Not met, progressing   TREATMENT: Therapeutic exercises, manual therapy, aquatic therapy, home exercise instruction,  and modalities as needed for pain to include:  electrical stimulation, moist heat, ice, and ultrasound    3. The patient has gait dysfunction.  LTG 3: 12 weeks:  The patient will ambulate without assistive device, independently, for community distances with minimal limp to the left lower extremity in order to improve mobility and allow patient to perform activities such as grocery shopping with greater ease.  STATUS:  Not met, progressing  STG 3a: The patient will be independent in [facility].  STATUS:  ongoing  TREATMENT: Gait  training, aquatic therapy, therapeutic exercise, and home exercise instruction.        4. Mobility: Walking/Moving Around Functional Limitation     LTG 4: 12 weeks:  The patient will demonstrate 1-19% limitation by achieving a score of 65-79 on the Lower Extremity Functional Scale.    STATUS:  Not met   STG 4a: 6 weeks:  The patient will demonstrate 20-39% limitation by achieving a score of 50-64 on the Lower Extremity Functional Scale.      STATUS:  MET   LTG 4b: 12 weeks:  The patient will demonstrate >13 repetitions on the 30 second sit to stand test for improved endurance and functional mobility.    STATUS:  Not met   TREATMENT:  Manual therapy, therapeutic exercise, home exercise instruction, and modalities as needed to include: moist heat, electrical stimulation, and ultrasound.         Plan  Therapy options: will be seen for skilled therapy services  Planned modality interventions: TENS, cryotherapy and thermotherapy (hydrocollator packs)  Other planned modality interventions: aquatic therapy  Planned therapy interventions: functional ROM exercises, gait training, home exercise program, manual therapy, strengthening, stretching, therapeutic activities, soft tissue mobilization, joint mobilization, neuromuscular re-education and balance/weight-bearing training  Frequency: 3x week  Duration in weeks: 12  Treatment plan discussed with: patient      Progress toward previous goals: Partially Met    See Exercise, Manual, and Modality Logs for complete treatment.         Recommendations: Continue as planned  Timeframe: 2 months  Prognosis to achieve goals: good    PT Signature: Koffi Lopez PT    Electronically signed 6/22/2022    [place] License: PT - [id number]     Based upon review of the patient's progress and continued therapy plan, it is my medical opinion that Maritza Whitmore should continue physical therapy treatment at [facility]  [address]   [place]  [address]  [phone].      Timed:         Manual Therapy:    0     mins  21112;     Therapeutic Exercise:    0     mins  33588;     Neuromuscular Amira:    0    mins  82004;    Therapeutic Activity:     0     mins  70068;     Gait Trainin     mins  14509;     Ultrasound:     0     mins  48580;    Ionto                               0    mins   21191  Self Care                       0     mins   76385  Aquatic                          24     mins 94445            Timed Treatment:   24   mins   Total Treatment:     24   mins      I certify that the therapy services are furnished while this patient is under my care.  The services outlined above are required by this patient, and will be reviewed every 90 days.

## 2022-06-27 ENCOUNTER — TREATMENT (OUTPATIENT)
Dept: PHYSICAL THERAPY | Facility: CLINIC | Age: 82
End: 2022-06-27

## 2022-06-27 DIAGNOSIS — R53.1 WEAKNESS: Primary | ICD-10-CM

## 2022-06-27 DIAGNOSIS — R26.9 GAIT DISTURBANCE: ICD-10-CM

## 2022-06-27 DIAGNOSIS — R29.898 WEAKNESS OF LEFT LOWER EXTREMITY: ICD-10-CM

## 2022-06-27 DIAGNOSIS — R29.898 WEAKNESS OF RIGHT LOWER EXTREMITY: ICD-10-CM

## 2022-06-27 PROCEDURE — 97113 AQUATIC THERAPY/EXERCISES: CPT | Performed by: PHYSICAL THERAPIST

## 2022-06-27 NOTE — PROGRESS NOTES
Physical Therapy Daily Treatment Note        Patient: Maritza Whitmore   : 1940  Diagnosis/ICD-10 Code:  Weakness [R53.1]  Referring practitioner: Jia Ellison, *  Date of Initial Visit: Type: THERAPY  Noted: 2022  Today's Date: 2022  Patient seen for 8 sessions             Subjective   Maritza Whitmore reports: still being a little stiff first thing in the morning. States her dog isn't pulling as much, so it's not bothering her back to be out walking.     Objective   No complaints of increased pain or discomfort.     See Exercise, Manual, and Modality Logs for complete treatment.       Assessment/Plan  Maritza progressing as evident by decreased overall back and leg pain pain, although stiff first thing in the morning. Pt tolerated exercises well, no complaints of increased pain or discomfort. Pt would benefit from skilled PT to address Range of Motion  and Strength deficits, pain management and any concerns with ADLs.       Progress per Plan of Care           Timed:  Manual Therapy:         mins  65213;  Therapeutic Exercise:         mins  32427;     Neuromuscular Amira:        mins  69957;    Therapeutic Activity:          mins  59191;     Gait Training:           mins  81703;    Aquatic Therapy:     30     mins  36175;       Untimed:  Electrical Stimulation:         mins  29232 ( );  Mechanical Traction:         mins  00033;       Timed Treatment:   30   mins   Total Treatment:     30   mins      Electronically signed:   Jennifer Lopez PTA  Physical Therapist Assistant  [facility] License #: [id number]

## 2022-06-29 ENCOUNTER — HOSPITAL ENCOUNTER (OUTPATIENT)
Dept: GENERAL RADIOLOGY | Facility: HOSPITAL | Age: 82
Discharge: HOME | End: 2022-06-29

## 2022-06-29 ENCOUNTER — OFFICE VISIT (OUTPATIENT)
Dept: FAMILY MEDICINE CLINIC | Facility: CLINIC | Age: 82
End: 2022-06-29

## 2022-06-29 VITALS
HEIGHT: 62 IN | HEART RATE: 80 BPM | OXYGEN SATURATION: 97 % | DIASTOLIC BLOOD PRESSURE: 58 MMHG | TEMPERATURE: 98.4 F | WEIGHT: 153.6 LBS | BODY MASS INDEX: 28.26 KG/M2 | SYSTOLIC BLOOD PRESSURE: 100 MMHG

## 2022-06-29 DIAGNOSIS — M25.559 HIP PAIN: ICD-10-CM

## 2022-06-29 DIAGNOSIS — M54.50 LUMBAR BACK PAIN: ICD-10-CM

## 2022-06-29 DIAGNOSIS — I10 PRIMARY HYPERTENSION: ICD-10-CM

## 2022-06-29 DIAGNOSIS — E53.8 VITAMIN B12 DEFICIENCY: ICD-10-CM

## 2022-06-29 DIAGNOSIS — I25.810 CORONARY ARTERY DISEASE INVOLVING OTHER CORONARY ARTERY BYPASS GRAFT WITHOUT ANGINA PECTORIS: ICD-10-CM

## 2022-06-29 DIAGNOSIS — E03.9 HYPOTHYROIDISM, UNSPECIFIED TYPE: ICD-10-CM

## 2022-06-29 DIAGNOSIS — Z95.1 HISTORY OF HEART BYPASS SURGERY: Primary | ICD-10-CM

## 2022-06-29 DIAGNOSIS — E78.5 HYPERLIPIDEMIA, UNSPECIFIED HYPERLIPIDEMIA TYPE: ICD-10-CM

## 2022-06-29 DIAGNOSIS — J43.9 PULMONARY EMPHYSEMA, UNSPECIFIED EMPHYSEMA TYPE: ICD-10-CM

## 2022-06-29 DIAGNOSIS — E55.9 VITAMIN D DEFICIENCY: ICD-10-CM

## 2022-06-29 DIAGNOSIS — E11.9 TYPE 2 DIABETES MELLITUS WITHOUT COMPLICATION, WITHOUT LONG-TERM CURRENT USE OF INSULIN: ICD-10-CM

## 2022-06-29 PROCEDURE — 72110 X-RAY EXAM L-2 SPINE 4/>VWS: CPT

## 2022-06-29 PROCEDURE — 99214 OFFICE O/P EST MOD 30 MIN: CPT | Performed by: NURSE PRACTITIONER

## 2022-06-29 PROCEDURE — 73502 X-RAY EXAM HIP UNI 2-3 VIEWS: CPT

## 2022-06-29 PROCEDURE — 96372 THER/PROPH/DIAG INJ SC/IM: CPT | Performed by: NURSE PRACTITIONER

## 2022-06-29 RX ORDER — TRIAMCINOLONE ACETONIDE 40 MG/ML
40 INJECTION, SUSPENSION INTRA-ARTICULAR; INTRAMUSCULAR ONCE
Status: COMPLETED | OUTPATIENT
Start: 2022-06-29 | End: 2022-06-29

## 2022-06-29 RX ADMIN — TRIAMCINOLONE ACETONIDE 40 MG: 40 INJECTION, SUSPENSION INTRA-ARTICULAR; INTRAMUSCULAR at 15:01

## 2022-06-30 ENCOUNTER — TELEPHONE (OUTPATIENT)
Dept: FAMILY MEDICINE CLINIC | Facility: CLINIC | Age: 82
End: 2022-06-30

## 2022-06-30 ENCOUNTER — TREATMENT (OUTPATIENT)
Dept: PHYSICAL THERAPY | Facility: CLINIC | Age: 82
End: 2022-06-30

## 2022-06-30 DIAGNOSIS — M25.559 HIP PAIN: Primary | ICD-10-CM

## 2022-06-30 DIAGNOSIS — R53.1 WEAKNESS: Primary | ICD-10-CM

## 2022-06-30 DIAGNOSIS — R26.9 GAIT DISTURBANCE: ICD-10-CM

## 2022-06-30 DIAGNOSIS — M54.50 LUMBAR BACK PAIN: ICD-10-CM

## 2022-06-30 DIAGNOSIS — R29.898 WEAKNESS OF RIGHT LOWER EXTREMITY: ICD-10-CM

## 2022-06-30 DIAGNOSIS — M47.816 OSTEOARTHRITIS OF LUMBAR SPINE, UNSPECIFIED SPINAL OSTEOARTHRITIS COMPLICATION STATUS: ICD-10-CM

## 2022-06-30 DIAGNOSIS — R29.898 WEAKNESS OF LEFT LOWER EXTREMITY: ICD-10-CM

## 2022-06-30 DIAGNOSIS — M51.36 DISC DEGENERATION, LUMBAR: Primary | ICD-10-CM

## 2022-06-30 PROCEDURE — 97110 THERAPEUTIC EXERCISES: CPT | Performed by: PHYSICAL THERAPIST

## 2022-06-30 PROCEDURE — 97530 THERAPEUTIC ACTIVITIES: CPT | Performed by: PHYSICAL THERAPIST

## 2022-06-30 NOTE — PROGRESS NOTES
Physical Therapy Daily Treatment Note        Patient: Maritza Whitmore   : 1940  Diagnosis/ICD-10 Code:  Weakness [R53.1]  Referring practitioner: Jia Ellison, *  Date of Initial Visit: Type: THERAPY  Noted: 2022  Today's Date: 2022  Patient seen for 9 sessions             Subjective   Maritza Whitmore reports: back and hips both feeling better, states that she had an MRI on her back and hip and will follow up with MD for results.    Objective   No complaints of increased pain or discomfort.    See Exercise, Manual, and Modality Logs for complete treatment.       Assessment/Plan  Maritza progressing as evident by decreased overall hip and back  pain. Pt tolerated exercises well, no complaints of increased pain or discomfort. Pt would benefit from skilled PT to address Range of Motion  and Strength deficits, pain management and any concerns with ADLs.       Progress per Plan of Care           Timed:  Manual Therapy:         mins  21181;  Therapeutic Exercise:    20     mins  18456;     Neuromuscular Amira:        mins  56882;    Therapeutic Activity:     10     mins  85648;     Gait Training:           mins  16220;    Aquatic Therapy:          mins  62338;       Untimed:  Electrical Stimulation:         mins  45544 ( );  Mechanical Traction:         mins  37689;       Timed Treatment:   30   mins   Total Treatment:     30   mins      Electronically signed:   Jennifer Lopez PTA  Physical Therapist Assistant  [facility] License #: [id number]

## 2022-06-30 NOTE — TELEPHONE ENCOUNTER
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

## 2022-06-30 NOTE — TELEPHONE ENCOUNTER
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 Has she had a hx of back surgery-it looks like it on the xray-if so she will need a MRI lumbar spine with contrast  
My chart message sent    Results seen via [employer]    
[phone]

## 2022-07-05 ENCOUNTER — TREATMENT (OUTPATIENT)
Dept: PHYSICAL THERAPY | Facility: CLINIC | Age: 82
End: 2022-07-05

## 2022-07-05 ENCOUNTER — TELEPHONE (OUTPATIENT)
Dept: FAMILY MEDICINE CLINIC | Facility: CLINIC | Age: 82
End: 2022-07-05

## 2022-07-05 DIAGNOSIS — R29.898 WEAKNESS OF LEFT LOWER EXTREMITY: ICD-10-CM

## 2022-07-05 DIAGNOSIS — R53.1 WEAKNESS: Primary | ICD-10-CM

## 2022-07-05 DIAGNOSIS — R26.9 GAIT DISTURBANCE: ICD-10-CM

## 2022-07-05 DIAGNOSIS — R29.898 WEAKNESS OF RIGHT LOWER EXTREMITY: ICD-10-CM

## 2022-07-05 PROCEDURE — 97530 THERAPEUTIC ACTIVITIES: CPT | Performed by: PHYSICAL THERAPIST

## 2022-07-05 PROCEDURE — 97110 THERAPEUTIC EXERCISES: CPT | Performed by: PHYSICAL THERAPIST

## 2022-07-05 NOTE — PROGRESS NOTES
Physical Therapy Daily Treatment Note        Patient: Maritza Whitmore   : 1940  Diagnosis/ICD-10 Code:  Weakness [R53.1]  Referring practitioner: Jia Ellison, *  Date of Initial Visit: Type: THERAPY  Noted: 2022  Today's Date: 2022  Patient seen for 10 sessions             Subjective   Maritza Whitmore reports: back feeling better, isn't sure if from therapy or the injection she got in her back last week. States that her back is better getting up in the morning, even walking the dogs isn't to bad.     Objective   No complaints of increased pain or discomfort.     See Exercise, Manual, and Modality Logs for complete treatment.       Assessment/Plan  Maritza progressing as evident by decreased overall back  pain. Pt tolerated exercises well, no complaints of increased pain or discomfort. Pt would benefit from skilled PT to address Range of Motion  and Strength deficits, pain management and any concerns with ADLs.       Progress per Plan of Care           Timed:  Manual Therapy:         mins  52676;  Therapeutic Exercise:    20     mins  95778;     Neuromuscular Amira:        mins  51278;    Therapeutic Activity:     10     mins  82754;     Gait Training:           mins  27750;    Aquatic Therapy:          mins  72462;       Untimed:  Electrical Stimulation:         mins  93834 ( );  Mechanical Traction:         mins  64854;       Timed Treatment:   30   mins   Total Treatment:     30   mins      Electronically signed:   Jennifer Lopez PTA  Physical Therapist Assistant  [facility] License #: [id number]

## 2022-07-07 ENCOUNTER — TREATMENT (OUTPATIENT)
Dept: PHYSICAL THERAPY | Facility: CLINIC | Age: 82
End: 2022-07-07

## 2022-07-07 DIAGNOSIS — R29.898 WEAKNESS OF RIGHT LOWER EXTREMITY: ICD-10-CM

## 2022-07-07 DIAGNOSIS — R26.9 GAIT DISTURBANCE: ICD-10-CM

## 2022-07-07 DIAGNOSIS — R29.898 WEAKNESS OF LEFT LOWER EXTREMITY: ICD-10-CM

## 2022-07-07 DIAGNOSIS — R53.1 WEAKNESS: Primary | ICD-10-CM

## 2022-07-07 PROCEDURE — 97140 MANUAL THERAPY 1/> REGIONS: CPT | Performed by: PHYSICAL THERAPIST

## 2022-07-07 PROCEDURE — 97110 THERAPEUTIC EXERCISES: CPT | Performed by: PHYSICAL THERAPIST

## 2022-07-07 NOTE — PROGRESS NOTES
Physical Therapy Daily Treatment Note        Patient: Maritza Whitmore   : 1940  Diagnosis/ICD-10 Code:  Weakness [R53.1]  Referring practitioner: Jia Ellison, *  Date of Initial Visit: Type: THERAPY  Noted: 2022  Today's Date: 2022  Patient seen for 11 sessions             Subjective   Maritza Whitmore reports: having increased pain in the outside of the right hip, hasn't had pain there before.    Objective   Pt ambulating with gait deviations due to R lateral hip pain.   Minimal discomfort with Soft Tissue Massage to lateral hip.     See Exercise, Manual, and Modality Logs for complete treatment.       Assessment/Plan  Maritza still experiencing increased R hip pain, even ambulating with gait deviations. Pt had some increased discomfort with with Soft Tissue Massage to R lateral hip. Pt would benefit from skilled PT to address Range of Motion  and Strength deficits, pain management and any concerns with ADLs.     Progress per Plan of Care           Timed:  Manual Therapy:    10     mins  47676;  Therapeutic Exercise:    20     mins  19813;     Neuromuscular Amira:        mins  08571;    Therapeutic Activity:          mins  27529;     Gait Training:           mins  62533;    Aquatic Therapy:          mins  14174;       Untimed:  Electrical Stimulation:         mins  38703 ( );  Mechanical Traction:         mins  98464;       Timed Treatment:   30   mins   Total Treatment:     30   mins      Electronically signed:   Jennifer Lopez PTA  Physical Therapist Assistant  \[facility]\"" License #: [id number]

## 2022-07-12 ENCOUNTER — TREATMENT (OUTPATIENT)
Dept: PHYSICAL THERAPY | Facility: CLINIC | Age: 82
End: 2022-07-12

## 2022-07-12 DIAGNOSIS — M54.50 LOW BACK PAIN, UNSPECIFIED BACK PAIN LATERALITY, UNSPECIFIED CHRONICITY, UNSPECIFIED WHETHER SCIATICA PRESENT: ICD-10-CM

## 2022-07-12 DIAGNOSIS — R29.898 WEAKNESS OF RIGHT LOWER EXTREMITY: ICD-10-CM

## 2022-07-12 DIAGNOSIS — M25.551 RIGHT HIP PAIN: ICD-10-CM

## 2022-07-12 DIAGNOSIS — R53.1 WEAKNESS: Primary | ICD-10-CM

## 2022-07-12 DIAGNOSIS — R29.898 WEAKNESS OF LEFT LOWER EXTREMITY: ICD-10-CM

## 2022-07-12 PROCEDURE — 97110 THERAPEUTIC EXERCISES: CPT | Performed by: PHYSICAL THERAPIST

## 2022-07-12 PROCEDURE — 97530 THERAPEUTIC ACTIVITIES: CPT | Performed by: PHYSICAL THERAPIST

## 2022-07-12 NOTE — PROGRESS NOTES
Re-Assessment / Re-Certification      Patient: Maritza Whitmore   : 1940  Diagnosis/ICD-10 Code:  Weakness [R53.1]  Referring practitioner: Jia Ellison, *  Date of Initial Visit: Type: THERAPY  Noted: 2022  Today's Date: 2022  Patient seen for 12 sessions      Subjective:   Subjective Questionnaire: Oswestry: 14/45 = 31% limitation  Clinical Progress: improved  Home Program Compliance: Yes  Treatment has included: therapeutic exercise, manual therapy, therapeutic activity and aquatic    Subjective Evaluation    History of Present Illness  Mechanism of injury: Pt presents to therapy for re-evaluation and addition of two new diagnoses for the lower back and right hip. She reports her back/hip are sore usually all the time, has trouble with walking, but even worse with walking her dog when he pulls her. She feels like the pain starts at the right lower back and runs down the back of her leg.    Pain  Current pain ratin  At worst pain ratin           Objective          Palpation   Left   Muscle spasm in the erector spinae.   Tenderness of the erector spinae.     Right   Muscle spasm in the erector spinae. Tenderness of the erector spinae.     Active Range of Motion     Lumbar   Flexion: WFL  Extension: WFL  Left lateral flexion: 25 degrees   Right lateral flexion: 20 degrees with pain    Right Hip   Flexion: 110 degrees   Extension: 30 degrees   Abduction: 45 degrees   External rotation (90/90): 40 degrees   Internal rotation (90/90): 20 degrees     Additional Active Range of Motion Details  Rotation WFL    Strength/Myotome Testing     Left Hip   Planes of Motion   Flexion: 4  Extension: 4  Abduction: 4  Adduction: 4+    Right Hip   Planes of Motion   Flexion: 4  Extension: 4  Abduction: 4-  Adduction: 4+    Ambulation     Comments   Stiffness throughout her trunk with mild antalgia right hip      See Exercise, Manual, and Modality Logs for complete treatment.     Assessment & Plan      Assessment  Impairments: abnormal gait, abnormal muscle firing, abnormal or restricted ROM, activity intolerance, impaired balance, impaired physical strength, pain with function and weight-bearing intolerance  Functional Limitations: walking, standing and stooping  Assessment details: The patient presents to physical therapy with complaints of weakness in her lower extremities, she is still very weak in her left hip which may have contributions from the lower back. She may benefit from a spine referral to assess baseline since she has moved from [place]. Her strength has improved overall, but the left hip abductors are very weak. The patient presents with associated hip/LE weakness, stiffness, and functional deficits (LEFS). The patient would benefit from ongoing skilled PT intervention to address the above mentioned functional limitations.      Prognosis: good    Goals  Plan Goals:     1. The patient complains of lower back/ hip/LE pain.   LTG 1: 12 weeks:  The patient will report a pain rating of 2/10 or better in order to improve  tolerance to activities of daily living and improve sleep quality.    STATUS: Not met, progressing   STG 1a: 6 weeks:  The patient will report a pain rating of 4/10 or better.    STATUS:  Not met, progressing   TREATMENT:  Therapeutic exercises, manual therapy, aquatic therapy, home exercise   instruction, and modalities as needed for pain to include:  electrical stimulation, moist heat, ice,   ultrasound, and diathermy.    2. The patient demonstrates weakness of the bilateral hip/knees.   LTG 2: 12 weeks:  The patient will demonstrate 4+/5 strength for bilateral hip flexion, abduction,  and extension; knee extension, flexion in order to improve hip stability.    STATUS:  Not met, progressing   STG 2a: 6 weeks:  The patient will demonstrate 4/5 strength for left hip flexion, abduction,  and extension.    STATUS:  Not met, progressing   TREATMENT: Therapeutic exercises, manual  therapy, aquatic therapy, home exercise instruction,  and modalities as needed for pain to include:  electrical stimulation, moist heat, ice, and ultrasound    3. The patient has gait dysfunction.  LTG 3: 12 weeks:  The patient will ambulate without assistive device, independently, for community distances with minimal limp to the left lower extremity in order to improve mobility and allow patient to perform activities such as grocery shopping with greater ease.  STATUS:  Not met, progressing  STG 3a: The patient will be independent in [facility].  STATUS:  ongoing  TREATMENT: Gait training, aquatic therapy, therapeutic exercise, and home exercise instruction.        4. Mobility: Walking/Moving Around Functional Limitation     LTG 4: 12 weeks:  The patient will demonstrate 1-19% limitation by achieving a score of 65-79 on the Lower Extremity Functional Scale.    STATUS:  Not met   STG 4a: 6 weeks:  The patient will demonstrate 20-39% limitation by achieving a score of 50-64 on the Lower Extremity Functional Scale.      STATUS:  MET   LTG 4b: 12 weeks:  The patient will demonstrate >13 repetitions on the 30 second sit to stand test for improved endurance and functional mobility.    STATUS:  Not met   TREATMENT:  Manual therapy, therapeutic exercise, home exercise instruction, and modalities as needed to include: moist heat, electrical stimulation, and ultrasound.        5. Mobility: Walking/Moving Around Functional Limitation    LTG 5: 12 weeks:  The patient will demonstrate 1-19 % limitation by achieving a score of 1-9 on the BRANDON.  STATUS:  New  STG 5 a: 6 weeks:  The patient will demonstrate a score of <14 on the BRANDON.    STATUS:  New  TREATMENT:  Manual therapy, therapeutic exercise, home exercise instruction, and modalities as needed to include: moist heat, electrical stimulation, and ultrasound.         Plan  Therapy options: will be seen for skilled therapy services  Planned modality interventions: TENS, cryotherapy and  thermotherapy (hydrocollator packs)  Other planned modality interventions: aquatic therapy  Planned therapy interventions: functional ROM exercises, gait training, home exercise program, manual therapy, strengthening, stretching, therapeutic activities, soft tissue mobilization, joint mobilization, neuromuscular re-education and balance/weight-bearing training  Frequency: 3x week  Duration in weeks: 12  Treatment plan discussed with: patient      Progress toward previous goals: Partially Met        Recommendations: Continue as planned  Timeframe: 3 months  Prognosis to achieve goals: good    PT Signature: Koffi Lopez PT    Electronically signed 2022    [place] License: PT - [id number]     Based upon review of the patient's progress and continued therapy plan, it is my medical opinion that Maritza Whitmore should continue physical therapy treatment at [facility]  [address]   [place] [address] [phone].    Signature: __________________________________  Jia Ellison APRN  NPI: [id number]         90 Day Recertification  Certification Period: 2022 thru 10/9/2022  I certify that the therapy services are furnished while this patient is under my care.  The services outlined above are required by this patient, and will be reviewed every 90 days.      Please sign and return via fax to [phone]. Thank you, [facility].    Manual Therapy:    0     mins  44906;  Therapeutic Exercise:    14     mins  96284;     Neuromuscular Amira:    0    mins  73702;    Therapeutic Activity:     10     mins  53958;     Gait Trainin     mins  27757;     Ultrasound:     0     mins  75963;    Electrical Stimulation:    0     mins  58676 ( );  Dry Needling     0     mins self-pay  Moist Heat     0     mins No charge  Aquatic Therapy    0     mins  78777  Re-Eval                           0    mins  74561    Timed Treatment:   24   mins    Total Treatment:     24   mins

## 2022-07-15 ENCOUNTER — TREATMENT (OUTPATIENT)
Dept: PHYSICAL THERAPY | Facility: CLINIC | Age: 82
End: 2022-07-15

## 2022-07-15 DIAGNOSIS — R53.1 WEAKNESS: Primary | ICD-10-CM

## 2022-07-15 DIAGNOSIS — R29.898 WEAKNESS OF RIGHT LOWER EXTREMITY: ICD-10-CM

## 2022-07-15 DIAGNOSIS — R29.898 WEAKNESS OF LEFT LOWER EXTREMITY: ICD-10-CM

## 2022-07-15 DIAGNOSIS — R26.9 GAIT DISTURBANCE: ICD-10-CM

## 2022-07-15 DIAGNOSIS — M25.551 RIGHT HIP PAIN: ICD-10-CM

## 2022-07-15 DIAGNOSIS — M54.50 LOW BACK PAIN, UNSPECIFIED BACK PAIN LATERALITY, UNSPECIFIED CHRONICITY, UNSPECIFIED WHETHER SCIATICA PRESENT: ICD-10-CM

## 2022-07-15 PROCEDURE — 97110 THERAPEUTIC EXERCISES: CPT | Performed by: PHYSICAL THERAPIST

## 2022-07-15 PROCEDURE — 97140 MANUAL THERAPY 1/> REGIONS: CPT | Performed by: PHYSICAL THERAPIST

## 2022-07-15 NOTE — PROGRESS NOTES
Physical Therapy Daily Treatment Note        Patient: Maritza Whitmore   : 1940  Diagnosis/ICD-10 Code:  Weakness [R53.1]  Referring practitioner: Jia Ellison, *  Date of Initial Visit: Type: THERAPY  Noted: 2022  Today's Date: 7/15/2022  Patient seen for 13 sessions             Subjective   Maritza Whitmore reports: doing a little to much exercises because her right hip is starting to bother her a little bit.     Objective   Min to Mod vc to perform exercises correctly.     See Exercise, Manual, and Modality Logs for complete treatment.       Assessment/Plan  Maritza still experiencing increased R hip pain. Pt tolerated exercises well, no complaints of increased pain or discomfort. Pt would benefit from skilled PT to address Range of Motion  and Strength deficits, pain management and any concerns with ADLs.     Progress per Plan of Care           Timed:  Manual Therapy:    10     mins  50589;  Therapeutic Exercise:    20     mins  93223;     Neuromuscular Amira:        mins  13456;    Therapeutic Activity:          mins  99711;     Gait Training:           mins  89492;    Aquatic Therapy:          mins  28585;       Untimed:  Electrical Stimulation:         mins  55046 ( );  Mechanical Traction:         mins  76305;       Timed Treatment:   30   mins   Total Treatment:     30   mins      Electronically signed:   Jennifer Lopez PTA  Physical Therapist Assistant  [facility] License #: [id number]

## 2022-07-19 ENCOUNTER — TREATMENT (OUTPATIENT)
Dept: PHYSICAL THERAPY | Facility: CLINIC | Age: 82
End: 2022-07-19

## 2022-07-19 DIAGNOSIS — M54.50 LOW BACK PAIN, UNSPECIFIED BACK PAIN LATERALITY, UNSPECIFIED CHRONICITY, UNSPECIFIED WHETHER SCIATICA PRESENT: ICD-10-CM

## 2022-07-19 DIAGNOSIS — M25.551 RIGHT HIP PAIN: ICD-10-CM

## 2022-07-19 DIAGNOSIS — R29.898 WEAKNESS OF RIGHT LOWER EXTREMITY: ICD-10-CM

## 2022-07-19 DIAGNOSIS — R29.898 WEAKNESS OF LEFT LOWER EXTREMITY: ICD-10-CM

## 2022-07-19 DIAGNOSIS — R53.1 WEAKNESS: Primary | ICD-10-CM

## 2022-07-19 PROCEDURE — 97110 THERAPEUTIC EXERCISES: CPT | Performed by: PHYSICAL THERAPIST

## 2022-07-19 PROCEDURE — 97530 THERAPEUTIC ACTIVITIES: CPT | Performed by: PHYSICAL THERAPIST

## 2022-07-19 NOTE — PROGRESS NOTES
Physical Therapy Daily Treatment Note/Discharge    Patient: Maritza Whitmore   : 1940  Diagnosis/ICD-10 Code:  Weakness [R53.1]  Referring practitioner: Jia Ellison, *  Date of Initial Visit: Type: THERAPY  Noted: 2022  Today's Date: 2022  Patient seen for 14 sessions                 Subjective Evaluation    History of Present Illness  Mechanism of injury: Pt reporting she feels like today could be her last day, thinks she understands her exercises, though she over did it some. She is feeling better today.     Pain  Current pain rating: 3  Location: right hip/low back           Objective   See Exercise, Manual, and Modality Logs for complete treatment.       Assessment & Plan     Assessment    Assessment details: Pt tolerated today's session well. Reviewed her HEP and continued with core strengthening, pt demonstrates good understanding. Plan to discharge.     Plan  Therapy options: will not be seen for skilled therapy services  Treatment plan discussed with: patient      Dates  PT visit: 22 - 22  Number of Visits: 14         Goals: Partially Met    Discharge Plan: Continue with current home exercise program as instructed      Date of Discharge 22         Manual Therapy:    0     mins  13509;  Therapeutic Exercise:    24     mins  71614;     Neuromuscular Amira:    0    mins  41235;    Therapeutic Activity:     14     mins  19910;     Gait Trainin     mins  68238;     Ultrasound:     0     mins  18461;    Electrical Stimulation:    0     mins  95199 ( );  Dry Needling     0     mins self-pay;  Aquatic Therapy    0     mins  09111;  Mechanical Traction    0     mins  23336  Moist Heat     0     mins  No charge    Timed Treatment:   38   mins   Total Treatment:     38   mins    Koffi Lopez PT  Physical Therapist    Electronically signed 2022    [place] License: PT - [id number]

## 2022-08-05 DIAGNOSIS — G89.29 CHRONIC BILATERAL LOW BACK PAIN WITHOUT SCIATICA: ICD-10-CM

## 2022-08-05 DIAGNOSIS — E03.9 HYPOTHYROIDISM, UNSPECIFIED TYPE: ICD-10-CM

## 2022-08-05 DIAGNOSIS — E11.9 TYPE 2 DIABETES MELLITUS WITHOUT COMPLICATION, WITHOUT LONG-TERM CURRENT USE OF INSULIN: ICD-10-CM

## 2022-08-05 DIAGNOSIS — M54.50 CHRONIC BILATERAL LOW BACK PAIN WITHOUT SCIATICA: ICD-10-CM

## 2022-08-05 RX ORDER — LEVOTHYROXINE SODIUM 25 MCG
TABLET ORAL
Qty: 90 TABLET | Refills: 1 | Status: SHIPPED | OUTPATIENT
Start: 2022-08-05 | End: 2022-09-29

## 2022-08-05 RX ORDER — DULOXETIN HYDROCHLORIDE 60 MG/1
CAPSULE, DELAYED RELEASE ORAL
Qty: 90 CAPSULE | Refills: 1 | Status: SHIPPED | OUTPATIENT
Start: 2022-08-05

## 2022-08-05 RX ORDER — GLIPIZIDE 5 MG/1
TABLET ORAL
Qty: 90 TABLET | Refills: 1 | Status: SHIPPED | OUTPATIENT
Start: 2022-08-05

## 2022-08-08 ENCOUNTER — APPOINTMENT (OUTPATIENT)
Dept: MRI IMAGING | Facility: HOSPITAL | Age: 82
End: 2022-08-08

## 2022-08-09 DIAGNOSIS — I10 PRIMARY HYPERTENSION: ICD-10-CM

## 2022-08-09 RX ORDER — POTASSIUM CHLORIDE 1.5 G/1.77G
20 POWDER, FOR SOLUTION ORAL 2 TIMES DAILY
Qty: 180 EACH | Refills: 0 | Status: SHIPPED | OUTPATIENT
Start: 2022-08-09

## 2022-08-30 ENCOUNTER — TELEPHONE (OUTPATIENT)
Dept: FAMILY MEDICINE CLINIC | Facility: CLINIC | Age: 82
End: 2022-08-30

## 2022-09-02 NOTE — TELEPHONE ENCOUNTER
Notified pt. She hasnt taken her dose for today she will hold off on med and call the office on wed to update on symptoms.

## 2022-09-16 ENCOUNTER — TELEPHONE (OUTPATIENT)
Dept: FAMILY MEDICINE CLINIC | Facility: CLINIC | Age: 82
End: 2022-09-16

## 2022-09-16 DIAGNOSIS — I10 PRIMARY HYPERTENSION: ICD-10-CM

## 2022-09-16 RX ORDER — ISOSORBIDE MONONITRATE 60 MG/1
60 TABLET, EXTENDED RELEASE ORAL DAILY
Qty: 90 TABLET | Refills: 1 | Status: SHIPPED | OUTPATIENT
Start: 2022-09-16

## 2022-09-16 NOTE — TELEPHONE ENCOUNTER
Caller: Maritza Whitmore    Relationship: Self    Best call back number: [phone]    Requested Prescriptions:   Requested Prescriptions     Pending Prescriptions Disp Refills   • isosorbide mononitrate (IMDUR) 60 MG 24 hr tablet 90 tablet 1     Sig: Take 1 tablet by mouth Daily.        Pharmacy where request should be sent: [facility] [address] - [phone] [facility] [phone] FX       Does the patient have less than a 3 day supply:  [x] Yes  [] No    Mechelle Lopez Rep   09/16/22 10:55 EDT

## 2022-09-19 DIAGNOSIS — M1A.9XX0 CHRONIC GOUT WITHOUT TOPHUS, UNSPECIFIED CAUSE, UNSPECIFIED SITE: ICD-10-CM

## 2022-09-19 RX ORDER — ALLOPURINOL 300 MG/1
300 TABLET ORAL DAILY
Qty: 90 TABLET | Refills: 1 | Status: SHIPPED | OUTPATIENT
Start: 2022-09-19

## 2022-09-19 NOTE — TELEPHONE ENCOUNTER
Caller: Maritza Whitmore    Relationship: Self    Best call back number: [phone]    Requested Prescriptions:   Requested Prescriptions     Pending Prescriptions Disp Refills   • allopurinol (ZYLOPRIM) 300 MG tablet 90 tablet 1     Sig: Take 1 tablet by mouth Daily.        Pharmacy where request should be sent: [facility] [place] [address] [facility] [phone]  - [phone] FX     Additional details provided by patient:          Does the patient have less than a 3 day supply:  [x] Yes  [] No    Mechelle Ellison Rep   09/19/22 12:37 EDT

## 2022-09-27 ENCOUNTER — LAB (OUTPATIENT)
Dept: LAB | Facility: HOSPITAL | Age: 82
End: 2022-09-27

## 2022-09-27 LAB
25(OH)D3 SERPL-MCNC: 83.8 NG/ML (ref 30–100)
ALBUMIN SERPL-MCNC: 4.2 G/DL (ref 3.5–5.2)
ALBUMIN/GLOB SERPL: 1.6 G/DL
ALP SERPL-CCNC: 91 U/L (ref 39–117)
ALT SERPL W P-5'-P-CCNC: 31 U/L (ref 1–33)
ANION GAP SERPL CALCULATED.3IONS-SCNC: 11.3 MMOL/L (ref 5–15)
AST SERPL-CCNC: 25 U/L (ref 1–32)
BASOPHILS # BLD AUTO: 0.05 10*3/MM3 (ref 0–0.2)
BASOPHILS NFR BLD AUTO: 0.6 % (ref 0–1.5)
BILIRUB SERPL-MCNC: 1.1 MG/DL (ref 0–1.2)
BUN SERPL-MCNC: 16 MG/DL (ref 8–23)
BUN/CREAT SERPL: 18.4 (ref 7–25)
CALCIUM SPEC-SCNC: 9.6 MG/DL (ref 8.6–10.5)
CHLORIDE SERPL-SCNC: 101 MMOL/L (ref 98–107)
CHOLEST SERPL-MCNC: 135 MG/DL (ref 0–200)
CO2 SERPL-SCNC: 29.7 MMOL/L (ref 22–29)
CREAT SERPL-MCNC: 0.87 MG/DL (ref 0.57–1)
DEPRECATED RDW RBC AUTO: 45.2 FL (ref 37–54)
EGFRCR SERPLBLD CKD-EPI 2021: 67.4 ML/MIN/1.73
EOSINOPHIL # BLD AUTO: 0.07 10*3/MM3 (ref 0–0.4)
EOSINOPHIL NFR BLD AUTO: 0.9 % (ref 0.3–6.2)
ERYTHROCYTE [DISTWIDTH] IN BLOOD BY AUTOMATED COUNT: 13 % (ref 12.3–15.4)
GLOBULIN UR ELPH-MCNC: 2.7 GM/DL
GLUCOSE SERPL-MCNC: 158 MG/DL (ref 65–99)
HBA1C MFR BLD: 7 % (ref 4.8–5.6)
HCT VFR BLD AUTO: 44 % (ref 34–46.6)
HDLC SERPL-MCNC: 54 MG/DL (ref 40–60)
HGB BLD-MCNC: 15.5 G/DL (ref 12–15.9)
IMM GRANULOCYTES # BLD AUTO: 0.03 10*3/MM3 (ref 0–0.05)
IMM GRANULOCYTES NFR BLD AUTO: 0.4 % (ref 0–0.5)
LDLC SERPL CALC-MCNC: 54 MG/DL (ref 0–100)
LDLC/HDLC SERPL: 0.9 {RATIO}
LYMPHOCYTES # BLD AUTO: 1.92 10*3/MM3 (ref 0.7–3.1)
LYMPHOCYTES NFR BLD AUTO: 24.7 % (ref 19.6–45.3)
MCH RBC QN AUTO: 33.4 PG (ref 26.6–33)
MCHC RBC AUTO-ENTMCNC: 35.2 G/DL (ref 31.5–35.7)
MCV RBC AUTO: 94.8 FL (ref 79–97)
MONOCYTES # BLD AUTO: 0.85 10*3/MM3 (ref 0.1–0.9)
MONOCYTES NFR BLD AUTO: 10.9 % (ref 5–12)
NEUTROPHILS NFR BLD AUTO: 4.86 10*3/MM3 (ref 1.7–7)
NEUTROPHILS NFR BLD AUTO: 62.5 % (ref 42.7–76)
NRBC BLD AUTO-RTO: 0 /100 WBC (ref 0–0.2)
PLATELET # BLD AUTO: 214 10*3/MM3 (ref 140–450)
PMV BLD AUTO: 9.9 FL (ref 6–12)
POTASSIUM SERPL-SCNC: 3.8 MMOL/L (ref 3.5–5.2)
PROT SERPL-MCNC: 6.9 G/DL (ref 6–8.5)
RBC # BLD AUTO: 4.64 10*6/MM3 (ref 3.77–5.28)
SODIUM SERPL-SCNC: 142 MMOL/L (ref 136–145)
TRIGL SERPL-MCNC: 163 MG/DL (ref 0–150)
TSH SERPL DL<=0.05 MIU/L-ACNC: 4 UIU/ML (ref 0.27–4.2)
VIT B12 BLD-MCNC: >2000 PG/ML (ref 211–946)
VLDLC SERPL-MCNC: 27 MG/DL (ref 5–40)
WBC NRBC COR # BLD: 7.78 10*3/MM3 (ref 3.4–10.8)

## 2022-09-27 PROCEDURE — 36415 COLL VENOUS BLD VENIPUNCTURE: CPT | Performed by: NURSE PRACTITIONER

## 2022-09-27 PROCEDURE — 80053 COMPREHEN METABOLIC PANEL: CPT | Performed by: NURSE PRACTITIONER

## 2022-09-27 PROCEDURE — 83036 HEMOGLOBIN GLYCOSYLATED A1C: CPT | Performed by: NURSE PRACTITIONER

## 2022-09-27 PROCEDURE — 84443 ASSAY THYROID STIM HORMONE: CPT | Performed by: NURSE PRACTITIONER

## 2022-09-27 PROCEDURE — 85025 COMPLETE CBC W/AUTO DIFF WBC: CPT | Performed by: NURSE PRACTITIONER

## 2022-09-27 PROCEDURE — 80061 LIPID PANEL: CPT | Performed by: NURSE PRACTITIONER

## 2022-09-27 PROCEDURE — 82306 VITAMIN D 25 HYDROXY: CPT | Performed by: NURSE PRACTITIONER

## 2022-09-27 PROCEDURE — 82607 VITAMIN B-12: CPT | Performed by: NURSE PRACTITIONER

## 2022-09-29 ENCOUNTER — OFFICE VISIT (OUTPATIENT)
Dept: FAMILY MEDICINE CLINIC | Facility: CLINIC | Age: 82
End: 2022-09-29

## 2022-09-29 VITALS
WEIGHT: 153.8 LBS | HEART RATE: 84 BPM | DIASTOLIC BLOOD PRESSURE: 65 MMHG | OXYGEN SATURATION: 94 % | HEIGHT: 62 IN | BODY MASS INDEX: 28.3 KG/M2 | SYSTOLIC BLOOD PRESSURE: 113 MMHG

## 2022-09-29 DIAGNOSIS — E53.8 VITAMIN B12 DEFICIENCY: ICD-10-CM

## 2022-09-29 DIAGNOSIS — E11.9 TYPE 2 DIABETES MELLITUS WITHOUT COMPLICATION, WITHOUT LONG-TERM CURRENT USE OF INSULIN: ICD-10-CM

## 2022-09-29 DIAGNOSIS — Z23 NEED FOR INFLUENZA VACCINATION: Primary | ICD-10-CM

## 2022-09-29 DIAGNOSIS — F41.9 ANXIETY: ICD-10-CM

## 2022-09-29 PROCEDURE — 90662 IIV NO PRSV INCREASED AG IM: CPT | Performed by: NURSE PRACTITIONER

## 2022-09-29 PROCEDURE — G0008 ADMIN INFLUENZA VIRUS VAC: HCPCS | Performed by: NURSE PRACTITIONER

## 2022-09-29 PROCEDURE — 99214 OFFICE O/P EST MOD 30 MIN: CPT | Performed by: NURSE PRACTITIONER

## 2022-09-29 RX ORDER — BUSPIRONE HYDROCHLORIDE 5 MG/1
5 TABLET ORAL 2 TIMES DAILY PRN
Qty: 60 TABLET | Refills: 0 | Status: SHIPPED | OUTPATIENT
Start: 2022-09-29 | End: 2022-11-29

## 2022-09-29 RX ORDER — SEMAGLUTIDE 1.34 MG/ML
0.25 INJECTION, SOLUTION SUBCUTANEOUS WEEKLY
Qty: 3 ML | Refills: 0 | Status: SHIPPED | OUTPATIENT
Start: 2022-09-29 | End: 2022-10-17

## 2022-10-15 DIAGNOSIS — E11.9 TYPE 2 DIABETES MELLITUS WITHOUT COMPLICATION, WITHOUT LONG-TERM CURRENT USE OF INSULIN: ICD-10-CM

## 2022-10-17 RX ORDER — SEMAGLUTIDE 1.34 MG/ML
INJECTION, SOLUTION SUBCUTANEOUS
Qty: 1.5 ML | Refills: 1 | Status: SHIPPED | OUTPATIENT
Start: 2022-10-17

## 2022-11-28 DIAGNOSIS — F41.9 ANXIETY: ICD-10-CM

## 2022-11-29 RX ORDER — BUSPIRONE HYDROCHLORIDE 5 MG/1
TABLET ORAL
Qty: 60 TABLET | Refills: 0 | Status: SHIPPED | OUTPATIENT
Start: 2022-11-29